# Patient Record
Sex: FEMALE | URBAN - METROPOLITAN AREA
[De-identification: names, ages, dates, MRNs, and addresses within clinical notes are randomized per-mention and may not be internally consistent; named-entity substitution may affect disease eponyms.]

---

## 2019-06-19 ENCOUNTER — IMPORTED ENCOUNTER (OUTPATIENT)
Dept: URBAN - METROPOLITAN AREA CLINIC 88 | Facility: CLINIC | Age: 84
End: 2019-06-19

## 2021-05-26 ENCOUNTER — IMPORTED ENCOUNTER (OUTPATIENT)
Dept: URBAN - METROPOLITAN AREA CLINIC 88 | Facility: CLINIC | Age: 86
End: 2021-05-26

## 2021-11-19 ENCOUNTER — IMPORTED ENCOUNTER (OUTPATIENT)
Dept: URBAN - METROPOLITAN AREA CLINIC 88 | Facility: CLINIC | Age: 86
End: 2021-11-19

## 2022-02-14 ENCOUNTER — INPATIENT (INPATIENT)
Facility: HOSPITAL | Age: 87
LOS: 3 days | Discharge: NURSING, REHAB OR LONG-TERM CARE | DRG: 884 | End: 2022-02-18
Attending: HOSPITALIST | Admitting: STUDENT IN AN ORGANIZED HEALTH CARE EDUCATION/TRAINING PROGRAM
Payer: MEDICARE

## 2022-02-14 VITALS
TEMPERATURE: 99 F | DIASTOLIC BLOOD PRESSURE: 84 MMHG | HEIGHT: 61 IN | HEART RATE: 82 BPM | OXYGEN SATURATION: 97 % | SYSTOLIC BLOOD PRESSURE: 129 MMHG | WEIGHT: 160.06 LBS | RESPIRATION RATE: 18 BRPM

## 2022-02-14 DIAGNOSIS — Z98.890 OTHER SPECIFIED POSTPROCEDURAL STATES: Chronic | ICD-10-CM

## 2022-02-14 DIAGNOSIS — Z95.5 PRESENCE OF CORONARY ANGIOPLASTY IMPLANT AND GRAFT: Chronic | ICD-10-CM

## 2022-02-14 LAB
ALBUMIN SERPL ELPH-MCNC: 3.6 G/DL — SIGNIFICANT CHANGE UP (ref 3.3–5)
ALP SERPL-CCNC: 115 U/L — SIGNIFICANT CHANGE UP (ref 40–120)
ALT FLD-CCNC: 17 U/L — SIGNIFICANT CHANGE UP (ref 10–45)
AMMONIA BLD-MCNC: <10 UMOL/L — LOW (ref 11–55)
ANION GAP SERPL CALC-SCNC: 14 MMOL/L — SIGNIFICANT CHANGE UP (ref 5–17)
APPEARANCE UR: CLEAR — SIGNIFICANT CHANGE UP
APTT BLD: 30.1 SEC — SIGNIFICANT CHANGE UP (ref 27.5–35.5)
AST SERPL-CCNC: 13 U/L — SIGNIFICANT CHANGE UP (ref 10–40)
BACTERIA # UR AUTO: NEGATIVE — SIGNIFICANT CHANGE UP
BASE EXCESS BLDV CALC-SCNC: 3.1 MMOL/L — HIGH (ref -2–2)
BASOPHILS # BLD AUTO: 0.05 K/UL — SIGNIFICANT CHANGE UP (ref 0–0.2)
BASOPHILS NFR BLD AUTO: 0.4 % — SIGNIFICANT CHANGE UP (ref 0–2)
BILIRUB SERPL-MCNC: 0.4 MG/DL — SIGNIFICANT CHANGE UP (ref 0.2–1.2)
BILIRUB UR-MCNC: NEGATIVE — SIGNIFICANT CHANGE UP
BUN SERPL-MCNC: 29 MG/DL — HIGH (ref 7–23)
CA-I SERPL-SCNC: 1.19 MMOL/L — SIGNIFICANT CHANGE UP (ref 1.15–1.33)
CALCIUM SERPL-MCNC: 9.5 MG/DL — SIGNIFICANT CHANGE UP (ref 8.4–10.5)
CHLORIDE BLDV-SCNC: 97 MMOL/L — SIGNIFICANT CHANGE UP (ref 96–108)
CHLORIDE SERPL-SCNC: 95 MMOL/L — LOW (ref 96–108)
CK SERPL-CCNC: 85 U/L — SIGNIFICANT CHANGE UP (ref 25–170)
CO2 BLDV-SCNC: 30 MMOL/L — HIGH (ref 22–26)
CO2 SERPL-SCNC: 24 MMOL/L — SIGNIFICANT CHANGE UP (ref 22–31)
COLOR SPEC: YELLOW — SIGNIFICANT CHANGE UP
CREAT SERPL-MCNC: 1.27 MG/DL — SIGNIFICANT CHANGE UP (ref 0.5–1.3)
DIFF PNL FLD: NEGATIVE — SIGNIFICANT CHANGE UP
EOSINOPHIL # BLD AUTO: 0.17 K/UL — SIGNIFICANT CHANGE UP (ref 0–0.5)
EOSINOPHIL NFR BLD AUTO: 1.3 % — SIGNIFICANT CHANGE UP (ref 0–6)
EPI CELLS # UR: 4 /HPF — SIGNIFICANT CHANGE UP
GAS PNL BLDV: 129 MMOL/L — LOW (ref 136–145)
GAS PNL BLDV: SIGNIFICANT CHANGE UP
GAS PNL BLDV: SIGNIFICANT CHANGE UP
GLUCOSE BLDV-MCNC: 113 MG/DL — HIGH (ref 70–99)
GLUCOSE SERPL-MCNC: 111 MG/DL — HIGH (ref 70–99)
GLUCOSE UR QL: NEGATIVE — SIGNIFICANT CHANGE UP
HCO3 BLDV-SCNC: 28 MMOL/L — SIGNIFICANT CHANGE UP (ref 22–29)
HCT VFR BLD CALC: 33.7 % — LOW (ref 34.5–45)
HCT VFR BLDA CALC: 34 % — LOW (ref 34.5–46.5)
HGB BLD CALC-MCNC: 11.2 G/DL — LOW (ref 11.7–16.1)
HGB BLD-MCNC: 10.8 G/DL — LOW (ref 11.5–15.5)
HYALINE CASTS # UR AUTO: 4 /LPF — HIGH (ref 0–2)
IMM GRANULOCYTES NFR BLD AUTO: 0.5 % — SIGNIFICANT CHANGE UP (ref 0–1.5)
INR BLD: 1.17 RATIO — HIGH (ref 0.88–1.16)
KETONES UR-MCNC: NEGATIVE — SIGNIFICANT CHANGE UP
LACTATE BLDV-MCNC: 1.5 MMOL/L — SIGNIFICANT CHANGE UP (ref 0.7–2)
LEUKOCYTE ESTERASE UR-ACNC: NEGATIVE — SIGNIFICANT CHANGE UP
LIDOCAIN IGE QN: 32 U/L — SIGNIFICANT CHANGE UP (ref 7–60)
LYMPHOCYTES # BLD AUTO: 1.31 K/UL — SIGNIFICANT CHANGE UP (ref 1–3.3)
LYMPHOCYTES # BLD AUTO: 10.1 % — LOW (ref 13–44)
MAGNESIUM SERPL-MCNC: 1.7 MG/DL — SIGNIFICANT CHANGE UP (ref 1.6–2.6)
MCHC RBC-ENTMCNC: 29.4 PG — SIGNIFICANT CHANGE UP (ref 27–34)
MCHC RBC-ENTMCNC: 32 GM/DL — SIGNIFICANT CHANGE UP (ref 32–36)
MCV RBC AUTO: 91.8 FL — SIGNIFICANT CHANGE UP (ref 80–100)
MONOCYTES # BLD AUTO: 1.17 K/UL — HIGH (ref 0–0.9)
MONOCYTES NFR BLD AUTO: 9 % — SIGNIFICANT CHANGE UP (ref 2–14)
NEUTROPHILS # BLD AUTO: 10.18 K/UL — HIGH (ref 1.8–7.4)
NEUTROPHILS NFR BLD AUTO: 78.7 % — HIGH (ref 43–77)
NITRITE UR-MCNC: NEGATIVE — SIGNIFICANT CHANGE UP
NRBC # BLD: 0 /100 WBCS — SIGNIFICANT CHANGE UP (ref 0–0)
NT-PROBNP SERPL-SCNC: 593 PG/ML — HIGH (ref 0–300)
PCO2 BLDV: 46 MMHG — HIGH (ref 39–42)
PH BLDV: 7.4 — SIGNIFICANT CHANGE UP (ref 7.32–7.43)
PH UR: 6 — SIGNIFICANT CHANGE UP (ref 5–8)
PHOSPHATE SERPL-MCNC: 3.5 MG/DL — SIGNIFICANT CHANGE UP (ref 2.5–4.5)
PLATELET # BLD AUTO: 313 K/UL — SIGNIFICANT CHANGE UP (ref 150–400)
PO2 BLDV: 39 MMHG — SIGNIFICANT CHANGE UP (ref 25–45)
POTASSIUM BLDV-SCNC: 3.4 MMOL/L — LOW (ref 3.5–5.1)
POTASSIUM SERPL-MCNC: 3.6 MMOL/L — SIGNIFICANT CHANGE UP (ref 3.5–5.3)
POTASSIUM SERPL-SCNC: 3.6 MMOL/L — SIGNIFICANT CHANGE UP (ref 3.5–5.3)
PROT SERPL-MCNC: 7.1 G/DL — SIGNIFICANT CHANGE UP (ref 6–8.3)
PROT UR-MCNC: ABNORMAL
PROTHROM AB SERPL-ACNC: 13.9 SEC — HIGH (ref 10.6–13.6)
RAPID RVP RESULT: SIGNIFICANT CHANGE UP
RBC # BLD: 3.67 M/UL — LOW (ref 3.8–5.2)
RBC # FLD: 15.9 % — HIGH (ref 10.3–14.5)
RBC CASTS # UR COMP ASSIST: 1 /HPF — SIGNIFICANT CHANGE UP (ref 0–4)
SAO2 % BLDV: 69 % — SIGNIFICANT CHANGE UP (ref 67–88)
SARS-COV-2 RNA SPEC QL NAA+PROBE: SIGNIFICANT CHANGE UP
SODIUM SERPL-SCNC: 133 MMOL/L — LOW (ref 135–145)
SP GR SPEC: 1.02 — SIGNIFICANT CHANGE UP (ref 1.01–1.02)
TROPONIN T, HIGH SENSITIVITY RESULT: 39 NG/L — SIGNIFICANT CHANGE UP (ref 0–51)
TROPONIN T, HIGH SENSITIVITY RESULT: 40 NG/L — SIGNIFICANT CHANGE UP (ref 0–51)
UROBILINOGEN FLD QL: NEGATIVE — SIGNIFICANT CHANGE UP
WBC # BLD: 12.94 K/UL — HIGH (ref 3.8–10.5)
WBC # FLD AUTO: 12.94 K/UL — HIGH (ref 3.8–10.5)
WBC UR QL: 4 /HPF — SIGNIFICANT CHANGE UP (ref 0–5)

## 2022-02-14 PROCEDURE — 93010 ELECTROCARDIOGRAM REPORT: CPT | Mod: GC

## 2022-02-14 PROCEDURE — 70450 CT HEAD/BRAIN W/O DYE: CPT | Mod: 26,QQ

## 2022-02-14 PROCEDURE — 99285 EMERGENCY DEPT VISIT HI MDM: CPT | Mod: GC

## 2022-02-14 PROCEDURE — 93970 EXTREMITY STUDY: CPT | Mod: 26

## 2022-02-14 PROCEDURE — 74177 CT ABD & PELVIS W/CONTRAST: CPT | Mod: 26,QQ

## 2022-02-14 PROCEDURE — 71045 X-RAY EXAM CHEST 1 VIEW: CPT | Mod: 26

## 2022-02-14 NOTE — ED ADULT NURSE NOTE - OBJECTIVE STATEMENT
86 year old female coming in for weakness. Brought in by EMS from the [facility]. As per EMS the patient has been working with physical therapy and since Friday they have noticed she has been more weak. 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 As per the daughter who is at bedside the patient is normally able to ambulate with the PT or with strong assist how ever since Friday she has not been able to. As per the daughter the patient is at her baseline orientation. Pt is aox3 forgetful and has word finding trouble. 86 year old female coming in for weakness. Brought in by EMS from the [facility]. As per EMS the patient has been working with physical therapy and since Friday they have noticed she has been more weak. 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 As per the daughter who is at bedside the patient is normally able to ambulate with the PT or with strong assist how ever since Friday she has not been able to. As per the daughter the patient is at her baseline orientation. Pt is aox3 forgetful and has word finding trouble. No shortness of breath or difficulty breathing. No chest pain, pressure or palpitations. No abdominal pain, nausea or vomiting. No fever, chills, or body aches. No urinary symptoms, no burning, no foul smelling. Skin on bottom is intact. No swelling noted. 86 year old female coming in for weakness. Brought in by EMS from the [facility]. As per EMS the patient has been working with physical therapy and since Friday they have noticed she has been more weak. 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 As per the daughter who is at bedside the patient is normally able to ambulate with the PT or with strong assist how ever since Friday she has not been able to. As per the daughter the patient is at her baseline orientation. Pt is aox2 forgetful and has word finding trouble. Patient does have full ROM in all extremities. No shortness of breath or difficulty breathing. No chest pain, pressure or palpitations. No abdominal pain, nausea or vomiting. No fever, chills, or body aches. No urinary symptoms, no burning, no foul smelling. Skin on bottom is intact. No swelling noted.

## 2022-02-14 NOTE — ED PROVIDER NOTE - NSICDXPASTMEDICALHX_GEN_ALL_CORE_FT
PAST MEDICAL HISTORY:  Cerebrovascular accident (CVA) 07/21    History of DVT of lower extremity LLE    Hypertension     Hypothyroidism     Urinary incontinence

## 2022-02-14 NOTE — ED PROVIDER NOTE - OBJECTIVE STATEMENT
Pt is an 86yF, poor historian, with pmhx of hypothyroidism, CVA in 7/2021 w/ residual LUE weakness, ACS s/p stent on plavix, hx of DVT (previously on Lovenox) presenting with new onset of weakness and instability for past 2 days. Pt is alert and oriented but confused about why she is here. Brought in by daughter who reports that patient has been requiring more assistance with walking since Saturday, baseline walks independently with walker but now requires two person transport and is showing right-sided preference for mobility. Daughter said her mother told her she was experiencing left leg pain today. Daughter also notes that pt has been more lethargic and confused than usual over the past few days. Pt denies recent fever, chills, chest pain, abdominal pain, urinary discomfort, diarrhea. She lives at [facility]. Daughter denies any falls or trauma since hip injury last year.

## 2022-02-14 NOTE — ED PROVIDER NOTE - CLINICAL SUMMARY MEDICAL DECISION MAKING FREE TEXT BOX
Pt is an 86yF, poor historian, with pmhx of hypothyroidism, CVA in 7/2021 w/ residual LUE weakness, ACS s/p stent on plavix, hx of DVT (previously on Lovenox) presenting with new onset of weakness and instability for past 2 days w/ associated lethargy and confusion and onset of left leg pain today. Pt denies recent fever, chills, chest pain, abdominal pain, urinary discomfort, diarrhea. Pt is afebrile but tender to palpation in suprapubic area, suggesting possible UTI. Ddx also includes electrolyte imbalance vs. hypothyroidism (unknown compliance) vs anemia. Will order CBC, BMP, urinalysis. Less likely CVA given timing and lack of new focal deficits but will order CT Head due to history.  Given hx of DVT and calf/leg pain, will order b/l duplex of the lower extremities. Pt is an 86yF, poor historian, with pmhx of hypothyroidism, CVA in 7/2021 w/ residual LUE weakness, ACS s/p stent on plavix, hx of DVT (previously on Lovenox) presenting with new onset of weakness and instability for past 2 days w/ associated lethargy and confusion and onset of left leg pain today. Pt denies recent fever, chills, chest pain, abdominal pain, urinary discomfort, diarrhea. Pt is afebrile but tender to palpation in suprapubic area, suggesting possible UTI. Ddx also includes electrolyte imbalance vs. hypothyroidism (unknown compliance) vs anemia. Will order CBC, BMP, urinalysis. Less likely CVA given timing and lack of new focal deficits but will order CT Head due to history. 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 Given hx of DVT and calf/leg pain, will order b/l duplex of the lower extremities.

## 2022-02-14 NOTE — ED PROVIDER NOTE - NS ED ROS FT
CONSTITUTIONAL: No fevers or chills. +weakness, instability when walking  EYES/ENT: No visual changes;  No vertigo or throat pain   NECK: No pain or stiffness  RESPIRATORY: No cough, wheezing, hemoptysis; No shortness of breath  CARDIOVASCULAR: No chest pain or palpitations  GASTROINTESTINAL: No abdominal or epigastric pain. No nausea, vomiting, or hematemesis; No diarrhea. No melena or hematochezia. +constipation   GENITOURINARY: No dysuria, frequency or hematuria  NEUROLOGICAL: baseline residual LUE weakness. No numbness.   SKIN: No itching, burning, rashes, or lesions   All other review of systems is negative unless indicated above.

## 2022-02-14 NOTE — ED ADULT NURSE NOTE - BEFAST SPEECH PHRASE
REVIEW OF SYSTEMS:  GEN: no fever,    no chills  RESP: no SOB,   no cough  CVS: no chest pain,   no palpitations  GI: no abdominal pain,   no nausea,   no vomiting,    constipation,   no diarrhea  : no dysuria,   no frequency  NEURO: no headache,   no dizziness  PSYCH: no depression,   not anxious  Derm : no rash Yes

## 2022-02-14 NOTE — ED PROVIDER NOTE - ATTENDING CONTRIBUTION TO CARE
Patient is an 87 yo F with history of HTN, hypothyroidism, CVA in 2021 with residual LUE weakness, hx of DVT on lovenox here for evaluation of weakness and difficulty with walking x 2 days. Patient lives in the [facility], assisted living. Patient's daughter is at bedside providing additional history. Accordingly, patient walks with walker but has been unsteady, leaning to one side. She feels her mother has been more confused. No chest pain, shortness of breath, nausea, vomiting. No urinary symptoms, fevers, chills, headache. Patient also complaining of abdominal pain and leg pain.    VS noted  Gen. no acute distress, Non toxic   HEENT: EOMI, mmm  Lungs: CTAB/L no C/ W /R   CVS: RRR   Abd; Soft, mild ttp to lower abdomen, no rebound or guarding  Ext: b/l LE pitting edema  Skin: no rash  Neuro AAOx3, face symmetrical, strength RUE 5/5, LUE 4/5, non focal clear speech  a/p: generalized weakness, has hx of CVA with left sided weakness. Will check labs, CT imaging to evaluate for new stroke. Patient also complaining of lower abdominal pain. Will check CT A/P. Will check u/a to evaluate for UTI. If no etiology, plan for neurology consult.   - Lizeth BARRAZA

## 2022-02-14 NOTE — ED PROVIDER NOTE - PHYSICAL EXAMINATION
GENERAL: NAD, lying in bed comfortably  HEAD:  Atraumatic, Normocephalic  EYES: EOMI, PERRL, conjunctiva and sclera clear  ENT: Moist mucous membranes  NECK: Supple, No JVD  CHEST/LUNG: Clear to auscultation bilaterally; No rales, rhonchi, wheezing, or rubs. Unlabored respirations  HEART: Regular rate and rhythm; No murmurs, rubs, or gallops  ABDOMEN: BSx4; tender to palpation in LLQ and suprapubic area   EXTREMITIES:  2+ Peripheral Pulses, brisk capillary refill. No clubbing, cyanosis. +b/l LE pitting edema, pain upon palpation of calves b/l.   NERVOUS SYSTEM:  A&Ox3, cranial nerves grossly intact. RUE 5/5 strength. LUE 4/5 with lifting against gravity (baseline).  strength 5/5 b/l. Cannot lift lower extremities against gravity.  LE Dorsiflexion/Extension 5/5 b/l.   SKIN: No rashes or lesions

## 2022-02-14 NOTE — ED ADULT NURSE NOTE - NSIMPLEMENTINTERV_GEN_ALL_ED
Implemented All Fall with Harm Risk Interventions:  [place] to call system. Call bell, personal items and telephone within reach. Instruct patient to call for assistance. Room bathroom lighting operational. Non-slip footwear when patient is off stretcher. Physically safe environment: no spills, clutter or unnecessary equipment. Stretcher in lowest position, wheels locked, appropriate side rails in place. Provide visual cue, wrist band, yellow gown, etc. Monitor gait and stability. Monitor for mental status changes and reorient to person, place, and time. Review medications for side effects contributing to fall risk. Reinforce activity limits and safety measures with patient and family. Provide visual clues: red socks.

## 2022-02-15 DIAGNOSIS — D72.829 ELEVATED WHITE BLOOD CELL COUNT, UNSPECIFIED: ICD-10-CM

## 2022-02-15 DIAGNOSIS — G93.89 OTHER SPECIFIED DISORDERS OF BRAIN: ICD-10-CM

## 2022-02-15 DIAGNOSIS — R53.1 WEAKNESS: ICD-10-CM

## 2022-02-15 DIAGNOSIS — I25.10 ATHEROSCLEROTIC HEART DISEASE OF NATIVE CORONARY ARTERY WITHOUT ANGINA PECTORIS: ICD-10-CM

## 2022-02-15 DIAGNOSIS — I63.9 CEREBRAL INFARCTION, UNSPECIFIED: ICD-10-CM

## 2022-02-15 DIAGNOSIS — Z29.9 ENCOUNTER FOR PROPHYLACTIC MEASURES, UNSPECIFIED: ICD-10-CM

## 2022-02-15 DIAGNOSIS — S32.000A WEDGE COMPRESSION FRACTURE OF UNSPECIFIED LUMBAR VERTEBRA, INITIAL ENCOUNTER FOR CLOSED FRACTURE: ICD-10-CM

## 2022-02-15 DIAGNOSIS — I10 ESSENTIAL (PRIMARY) HYPERTENSION: ICD-10-CM

## 2022-02-15 DIAGNOSIS — R26.2 DIFFICULTY IN WALKING, NOT ELSEWHERE CLASSIFIED: ICD-10-CM

## 2022-02-15 DIAGNOSIS — E03.9 HYPOTHYROIDISM, UNSPECIFIED: ICD-10-CM

## 2022-02-15 LAB
ANION GAP SERPL CALC-SCNC: 14 MMOL/L — SIGNIFICANT CHANGE UP (ref 5–17)
BASOPHILS # BLD AUTO: 0.06 K/UL — SIGNIFICANT CHANGE UP (ref 0–0.2)
BASOPHILS NFR BLD AUTO: 0.6 % — SIGNIFICANT CHANGE UP (ref 0–2)
BUN SERPL-MCNC: 25 MG/DL — HIGH (ref 7–23)
CALCIUM SERPL-MCNC: 9.7 MG/DL — SIGNIFICANT CHANGE UP (ref 8.4–10.5)
CHLORIDE SERPL-SCNC: 97 MMOL/L — SIGNIFICANT CHANGE UP (ref 96–108)
CO2 SERPL-SCNC: 25 MMOL/L — SIGNIFICANT CHANGE UP (ref 22–31)
CREAT SERPL-MCNC: 1.2 MG/DL — SIGNIFICANT CHANGE UP (ref 0.5–1.3)
CULTURE RESULTS: NO GROWTH — SIGNIFICANT CHANGE UP
EOSINOPHIL # BLD AUTO: 0.29 K/UL — SIGNIFICANT CHANGE UP (ref 0–0.5)
EOSINOPHIL NFR BLD AUTO: 3 % — SIGNIFICANT CHANGE UP (ref 0–6)
GLUCOSE SERPL-MCNC: 104 MG/DL — HIGH (ref 70–99)
HCT VFR BLD CALC: 33.5 % — LOW (ref 34.5–45)
HGB BLD-MCNC: 10.9 G/DL — LOW (ref 11.5–15.5)
IMM GRANULOCYTES NFR BLD AUTO: 0.8 % — SIGNIFICANT CHANGE UP (ref 0–1.5)
LYMPHOCYTES # BLD AUTO: 1.14 K/UL — SIGNIFICANT CHANGE UP (ref 1–3.3)
LYMPHOCYTES # BLD AUTO: 11.7 % — LOW (ref 13–44)
MCHC RBC-ENTMCNC: 29.6 PG — SIGNIFICANT CHANGE UP (ref 27–34)
MCHC RBC-ENTMCNC: 32.5 GM/DL — SIGNIFICANT CHANGE UP (ref 32–36)
MCV RBC AUTO: 91 FL — SIGNIFICANT CHANGE UP (ref 80–100)
MONOCYTES # BLD AUTO: 0.86 K/UL — SIGNIFICANT CHANGE UP (ref 0–0.9)
MONOCYTES NFR BLD AUTO: 8.8 % — SIGNIFICANT CHANGE UP (ref 2–14)
NEUTROPHILS # BLD AUTO: 7.35 K/UL — SIGNIFICANT CHANGE UP (ref 1.8–7.4)
NEUTROPHILS NFR BLD AUTO: 75.1 % — SIGNIFICANT CHANGE UP (ref 43–77)
NRBC # BLD: 0 /100 WBCS — SIGNIFICANT CHANGE UP (ref 0–0)
PLATELET # BLD AUTO: 270 K/UL — SIGNIFICANT CHANGE UP (ref 150–400)
POTASSIUM SERPL-MCNC: 3.5 MMOL/L — SIGNIFICANT CHANGE UP (ref 3.5–5.3)
POTASSIUM SERPL-SCNC: 3.5 MMOL/L — SIGNIFICANT CHANGE UP (ref 3.5–5.3)
RBC # BLD: 3.68 M/UL — LOW (ref 3.8–5.2)
RBC # FLD: 15.9 % — HIGH (ref 10.3–14.5)
SODIUM SERPL-SCNC: 136 MMOL/L — SIGNIFICANT CHANGE UP (ref 135–145)
SPECIMEN SOURCE: SIGNIFICANT CHANGE UP
TSH SERPL-MCNC: 4.1 UIU/ML — SIGNIFICANT CHANGE UP (ref 0.27–4.2)
WBC # BLD: 9.78 K/UL — SIGNIFICANT CHANGE UP (ref 3.8–10.5)
WBC # FLD AUTO: 9.78 K/UL — SIGNIFICANT CHANGE UP (ref 3.8–10.5)

## 2022-02-15 PROCEDURE — 99223 1ST HOSP IP/OBS HIGH 75: CPT

## 2022-02-15 PROCEDURE — 12345: CPT | Mod: NC

## 2022-02-15 PROCEDURE — 99223 1ST HOSP IP/OBS HIGH 75: CPT | Mod: GC

## 2022-02-15 PROCEDURE — 70551 MRI BRAIN STEM W/O DYE: CPT | Mod: 26

## 2022-02-15 RX ORDER — DULOXETINE HYDROCHLORIDE 30 MG/1
60 CAPSULE, DELAYED RELEASE ORAL DAILY
Refills: 0 | Status: DISCONTINUED | OUTPATIENT
Start: 2022-02-15 | End: 2022-02-18

## 2022-02-15 RX ORDER — METHENAMINE MANDELATE 1 G
1 TABLET ORAL
Refills: 0 | Status: DISCONTINUED | OUTPATIENT
Start: 2022-02-15 | End: 2022-02-18

## 2022-02-15 RX ORDER — DULOXETINE HYDROCHLORIDE 30 MG/1
1 CAPSULE, DELAYED RELEASE ORAL
Qty: 0 | Refills: 0 | DISCHARGE

## 2022-02-15 RX ORDER — LOSARTAN POTASSIUM 100 MG/1
100 TABLET, FILM COATED ORAL DAILY
Refills: 0 | Status: DISCONTINUED | OUTPATIENT
Start: 2022-02-15 | End: 2022-02-18

## 2022-02-15 RX ORDER — HYDROCHLOROTHIAZIDE 25 MG
12.5 TABLET ORAL DAILY
Refills: 0 | Status: DISCONTINUED | OUTPATIENT
Start: 2022-02-15 | End: 2022-02-18

## 2022-02-15 RX ORDER — ACETAMINOPHEN 500 MG
2 TABLET ORAL
Qty: 0 | Refills: 0 | DISCHARGE

## 2022-02-15 RX ORDER — LACTOBACILLUS ACIDOPHILUS 100MM CELL
1 CAPSULE ORAL
Qty: 0 | Refills: 0 | DISCHARGE

## 2022-02-15 RX ORDER — LEVOTHYROXINE SODIUM 125 MCG
112 TABLET ORAL DAILY
Refills: 0 | Status: DISCONTINUED | OUTPATIENT
Start: 2022-02-15 | End: 2022-02-18

## 2022-02-15 RX ORDER — ACETAMINOPHEN 500 MG
650 TABLET ORAL EVERY 6 HOURS
Refills: 0 | Status: DISCONTINUED | OUTPATIENT
Start: 2022-02-15 | End: 2022-02-18

## 2022-02-15 RX ORDER — CHOLECALCIFEROL (VITAMIN D3) 125 MCG
1 CAPSULE ORAL
Qty: 0 | Refills: 0 | DISCHARGE

## 2022-02-15 RX ORDER — CLOPIDOGREL BISULFATE 75 MG/1
75 TABLET, FILM COATED ORAL DAILY
Refills: 0 | Status: DISCONTINUED | OUTPATIENT
Start: 2022-02-15 | End: 2022-02-18

## 2022-02-15 RX ORDER — ASCORBIC ACID 60 MG
500 TABLET,CHEWABLE ORAL DAILY
Refills: 0 | Status: DISCONTINUED | OUTPATIENT
Start: 2022-02-15 | End: 2022-02-18

## 2022-02-15 RX ORDER — CHOLECALCIFEROL (VITAMIN D3) 125 MCG
1000 CAPSULE ORAL DAILY
Refills: 0 | Status: DISCONTINUED | OUTPATIENT
Start: 2022-02-15 | End: 2022-02-18

## 2022-02-15 RX ORDER — HEPARIN SODIUM 5000 [USP'U]/ML
5000 INJECTION INTRAVENOUS; SUBCUTANEOUS EVERY 12 HOURS
Refills: 0 | Status: DISCONTINUED | OUTPATIENT
Start: 2022-02-15 | End: 2022-02-18

## 2022-02-15 RX ORDER — PANTOPRAZOLE SODIUM 20 MG/1
40 TABLET, DELAYED RELEASE ORAL
Refills: 0 | Status: DISCONTINUED | OUTPATIENT
Start: 2022-02-15 | End: 2022-02-18

## 2022-02-15 RX ORDER — SENNA PLUS 8.6 MG/1
2 TABLET ORAL AT BEDTIME
Refills: 0 | Status: DISCONTINUED | OUTPATIENT
Start: 2022-02-15 | End: 2022-02-18

## 2022-02-15 RX ORDER — ATORVASTATIN CALCIUM 80 MG/1
1 TABLET, FILM COATED ORAL
Qty: 0 | Refills: 0 | DISCHARGE

## 2022-02-15 RX ORDER — LACTOBACILLUS ACIDOPHILUS 100MM CELL
1 CAPSULE ORAL DAILY
Refills: 0 | Status: DISCONTINUED | OUTPATIENT
Start: 2022-02-15 | End: 2022-02-18

## 2022-02-15 RX ORDER — LEVOTHYROXINE SODIUM 125 MCG
1 TABLET ORAL
Qty: 0 | Refills: 0 | DISCHARGE

## 2022-02-15 RX ORDER — METHENAMINE MANDELATE 1 G
1 TABLET ORAL
Qty: 0 | Refills: 0 | DISCHARGE

## 2022-02-15 RX ORDER — CLOPIDOGREL BISULFATE 75 MG/1
1 TABLET, FILM COATED ORAL
Qty: 0 | Refills: 0 | DISCHARGE

## 2022-02-15 RX ORDER — ATORVASTATIN CALCIUM 80 MG/1
80 TABLET, FILM COATED ORAL AT BEDTIME
Refills: 0 | Status: DISCONTINUED | OUTPATIENT
Start: 2022-02-15 | End: 2022-02-18

## 2022-02-15 RX ADMIN — DULOXETINE HYDROCHLORIDE 60 MILLIGRAM(S): 30 CAPSULE, DELAYED RELEASE ORAL at 13:49

## 2022-02-15 RX ADMIN — Medication 12.5 MILLIGRAM(S): at 13:50

## 2022-02-15 RX ADMIN — Medication 500 MILLIGRAM(S): at 13:49

## 2022-02-15 RX ADMIN — Medication 1000 UNIT(S): at 13:50

## 2022-02-15 RX ADMIN — LOSARTAN POTASSIUM 100 MILLIGRAM(S): 100 TABLET, FILM COATED ORAL at 13:49

## 2022-02-15 RX ADMIN — Medication 1 GRAM(S): at 18:30

## 2022-02-15 RX ADMIN — Medication 112 MICROGRAM(S): at 13:50

## 2022-02-15 RX ADMIN — HEPARIN SODIUM 5000 UNIT(S): 5000 INJECTION INTRAVENOUS; SUBCUTANEOUS at 18:30

## 2022-02-15 RX ADMIN — ATORVASTATIN CALCIUM 80 MILLIGRAM(S): 80 TABLET, FILM COATED ORAL at 23:18

## 2022-02-15 RX ADMIN — CLOPIDOGREL BISULFATE 75 MILLIGRAM(S): 75 TABLET, FILM COATED ORAL at 13:50

## 2022-02-15 RX ADMIN — Medication 1 TABLET(S): at 13:50

## 2022-02-15 RX ADMIN — PANTOPRAZOLE SODIUM 40 MILLIGRAM(S): 20 TABLET, DELAYED RELEASE ORAL at 08:10

## 2022-02-15 RX ADMIN — SENNA PLUS 2 TABLET(S): 8.6 TABLET ORAL at 23:18

## 2022-02-15 RX ADMIN — Medication 1 TABLET(S): at 14:22

## 2022-02-15 RX ADMIN — Medication 1 TABLET(S): at 13:49

## 2022-02-15 NOTE — H&P ADULT - ASSESSMENT
86F w hx of CVA c/b LUE weakness, dementia?(charted A&OX2 baseline), CAD s/p stent, HTN, hx of DVT, hypothyroid p/w weakness and confusion from baseline admit to medicine for further evaluation.  - Neurology and NeuroSx consulted  - chronic appearing L3-L5 compression fracture and stenosis per NeuroSx does not require acute surgical intervention  - Per Neuro- MR brain      FULL H&P to follow 86F w hx of CVA c/b LUE weakness, dementia?(charted A&OX2 baseline), CAD s/p stent, HTN, hx of DVT, hypothyroid p/w weakness and confusion from baseline admit to medicine for further evaluation.

## 2022-02-15 NOTE — CONSULT NOTE ADULT - ASSESSMENT
INCOMPLETE  Assessment:  86y L-HF w h/o hypothyroidism, HTN, stroke w/ residual L. hemiparesis, urinary incontinence, LE DVT and dementia who p/w increased weakness.    On exam, she has a decreased level of attention, dysconjugate gaze, spastic L. hemiparesis and hyperreflexia.  No clear new localizing signs/symptoms.       IMPRESSION: Unclear subacute exacerbation of weakness.     Plan  [] MRI brain w/o contrast: Although unclear new lateralizing signs symptoms, given poor historian and h/o stroke would    []  []    Nando Braden, DO  PGY-4 Chief Neurology Resident  86y L-HF w h/o hypothyroidism, HTN, stroke (07/2021) w/ residual L. hemiparesis, urinary incontinence, LE DVT and unspecified dementia who p/w increased weakness x several days for whom neurology was consulted for radiographic finding suggestive of NPH.  On exam, she has a decreased level of attention, poor insight and memory, slightly reduced verbal output, dysconjugate at gaze at rest, spastic L. hemiparesis a/w hyperreflexia.  No clear new localizing signs/symptoms.    IMPRESSION: Acute onset significant generalized weakness LE>UE superimposed on chronic L. hemiparesis post stroke.  Etiology of acute worsening unclear, but given h/o stroke in the past and acute change should r/o acute ischemic infarct and also possible influence of age-indeterminate chronic-appearing L3-L5 compression fx.     Plan  [] MRI brain w/o contrast: Although unclear new lateralizing signs symptoms, given poor historian and h/o stroke would rule out new stroke.   [] For now, continue prior primary stroke prevention regiment.   [] Normotension given LKW at least >48hrs out  [] Continue metabolic/infectious work up to look for acute exacerbating factor.   [] Please call Dr. Grayson's group to discuss if either he or someone from the group will be seeing the PT rather than house staff resident neurology consult team.      Nando Braden, DO  PGY-4 Chief Neurology Resident

## 2022-02-15 NOTE — H&P ADULT - PROBLEM SELECTOR PLAN 8
family reports chronic leukocytosis  on hiprex outpatient  urine culture collected  no fever at time of medicine admit and is nontoxic appearing

## 2022-02-15 NOTE — H&P ADULT - NSHPLABSRESULTS_GEN_ALL_CORE
Personally reviewed available labs, imaging and ekg  [1]  CBC Full  -  ( 14 Feb 2022 20:24 )  WBC Count : 12.94 K/uL  RBC Count : 3.67 M/uL  Hemoglobin : 10.8 g/dL  Hematocrit : 33.7 %  Platelet Count - Automated : 313 K/uL  Mean Cell Volume : 91.8 fl  Mean Cell Hemoglobin : 29.4 pg  Mean Cell Hemoglobin Concentration : 32.0 gm/dL  Auto Neutrophil # : 10.18 K/uL  Auto Lymphocyte # : 1.31 K/uL  Auto Monocyte # : 1.17 K/uL  Auto Eosinophil # : 0.17 K/uL  Auto Basophil # : 0.05 K/uL  Auto Neutrophil % : 78.7 %  Auto Lymphocyte % : 10.1 %  Auto Monocyte % : 9.0 %  Auto Eosinophil % : 1.3 %  Auto Basophil % : 0.4 %    02-14    133<L>  |  95<L>  |  29<H>  ----------------------------<  111<H>  3.6   |  24  |  1.27    Ca    9.5      14 Feb 2022 20:24  Phos  3.5     02-14  Mg     1.7     02-14    TPro  7.1  /  Alb  3.6  /  TBili  0.4  /  DBili  x   /  AST  13  /  ALT  17  /  AlkPhos  115  02-14    PT/INR - ( 14 Feb 2022 20:24 )   PT: 13.9 sec;   INR: 1.17 ratio         PTT - ( 14 Feb 2022 20:24 )  PTT:30.1 sec  Imaging:  [ 2] I independently reviewed CT head and no acute hemorrhage is appreciated  EKG:  [2] I independently reviewed EKG/cardiac tracing and NSR appreciated

## 2022-02-15 NOTE — H&P ADULT - PROBLEM SELECTOR PLAN 3
hx of cva  c/w clopidogrel  has LUE weakness that is associated w/ previous CVA as well vascular dementia  - follows with neurologist Dr. Grayson of [facility] [phone]

## 2022-02-15 NOTE — H&P ADULT - NSICDXPASTMEDICALHX_GEN_ALL_CORE_FT
PAST MEDICAL HISTORY:  Cerebrovascular accident (CVA) 07/21    Dementia     History of DVT of lower extremity LLE    Hypertension     Hypothyroidism     Urinary incontinence

## 2022-02-15 NOTE — CONSULT NOTE ADULT - ASSESSMENT
MEENU, NACHAMA  86F hx prior cerebellar strokes 2021 w/ residual Lt-sided weakness, also CAD s/p stent on plavix, hx DVT, pw increased generalized weakness/instability x2d brought by daughter from assisted living, no trauma. CTH w/ likely ex vacuo hydro 2/2 cerebellar infarcts/microvasc changes/atrophy, chronic L3-5 comp fx w/ moderate spinal stenosis rajni at L4-5, no back pain or radic. Exam: EOV, Ox1-2 w/ choices, PERRL, +L beat nystagmus, no facial, BUE ag strong R>L, LLE HF 2/5, KF 3/5, DF/PF 4+/5, RLE 4+/5 throughout,+trace clonus RLE, no hyperreflexia.   - no acute nsgy intervention  - consider neurology fu for eval NPH vs vascular dementia   - can fu outpt w/ Dr. Norris prn if back pain or radicular sxs arise or if positive neurology eval for NPH  - PT, q4h neuro checks

## 2022-02-15 NOTE — CONSULT NOTE ADULT - SUBJECTIVE AND OBJECTIVE BOX
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 CTH w/ likely ex vacuo hydro 2/2 cerebellar infarcts/microvasc changes/atrophy, chronic L3-5 comp fx w/ moderate spinal stenosis rajni at L4-5, no back pain or radic. Exam: EOV, Ox1-2 w/ choices, PERRL, +L beat nystagmus, no facial, BUE ag strong R>L, LLE HF 2/5, KF 3/5, DF/PF 4+/5, RLE 4+/5 throughout,+trace clonus RLE, no hyperreflexia.     --Anticoagulation:    =====================  PAST MEDICAL HISTORY   Hypothyroidism    Hypertension    Cerebrovascular accident (CVA)    Urinary incontinence    History of DVT of lower extremity    Dementia      PAST SURGICAL HISTORY   Cerebrovascular accident (CVA)    History of hip surgery    Stented coronary artery          MEDICATIONS:  Antibiotics:    Neuro:    Other:      SOCIAL HISTORY:   Occupation:   Marital Status:     FAMILY HISTORY:      ROS: Negative except per HPI    LABS:  PT/INR - ( 14 Feb 2022 20:24 )   PT: 13.9 sec;   INR: 1.17 ratio         PTT - ( 14 Feb 2022 20:24 )  PTT:30.1 sec                        10.8   12.94 )-----------( 313      ( 14 Feb 2022 20:24 )             33.7     02-14    133<L>  |  95<L>  |  29<H>  ----------------------------<  111<H>  3.6   |  24  |  1.27    Ca    9.5      14 Feb 2022 20:24  Phos  3.5     02-14  Mg     1.7     02-14    TPro  7.1  /  Alb  3.6  /  TBili  0.4  /  DBili  x   /  AST  13  /  ALT  17  /  AlkPhos  115  02-14      
NEUROLOGY CONSULT  HPI: 86y L-handed woman w/ h/o dementia w/ visual hallcuinations/behavioral issues and prior stroke (07/2021) w/ residual L. hemiparesis, CAD s/p stent, HTN, DVT and hypothyroidism brought in from home for several days of worsening weakness and confusion.  Called daughter, Mrs. Zhanna Covington [phone], for collateral.     PT has had long-standing dementia and at baseline is able to have simple conversations w/ interaction of staff at [facility]/family, speak in short full sentences, is normally oriented to self and situation and ambulates w/ walker w/ L. hemiparesis.  LKW sometime around 02/10/22.  Daughter went to visit PT and she noted she was much more lethargic over the weekend  "staring off into space/not acting like herself" and was significantly weaker than normal.  Weakness was generalized in nature superimposed on her prior residual deficits w/ no reported new focal deficits.  Patient had bene working with physical therapy who also noted significant decline in mobility, now requiring one-two person assist to just stand up.  No clear fever, change in meds, infection or other trigger that daughter can think of.  PT had recently seen Dr. Grayson and was suggested to be started on medication for dementia (daughter unsure, possibly Aricept but family decided to hold off as PT had already had many medications.  Ventriculomegaly was discussed at that time, and per daughter, Dr. Grayson, explained that this was a part of her overall aging and atrophy rather than specifically attributable reason for behavior/gait issuses.  PT has also been incontinent for many years, independent of current presentation.  It is unclear if PT had actually any L. leg pain as some reports report yes and daughter unsure.  PT endorses pain overall but doesn't endorse/refute L. leg pain.     Of note, no reported back pain or falls from PT, daughter or home.  When discussing CT abdomen findings w/ daughter w/ reported findings of L3-L5 fractures, daughter denies being aware of any spinal fractures in past.      PMH  Hypothyroidism  Hypertension  07/2021 Ischemic stroke w/ residual L. hemiparesis   Urinary incontinence (several years)  History of DVT of lower extremity LLE  Dementia (W. hallucinations/behavioral component? Unspecified type)    Outpatient neurologist: Dr. Grayson    [facility]  History of hip surgery 7/2021  Stented coronary artery    Home Medications:  acetaminophen 325 mg oral tablet: 2 tab(s) orally every 6 hours, As Needed (15 Feb 2022 07:06)  Acidophilus oral capsule: 1 cap(s) orally once a day (15 Feb 2022 07:06)  atorvastatin 80 mg oral tablet: 1 tab(s) orally once a day (15 Feb 2022 07:06)  Citracal 250 mg + D 200 intl units oral tablet: 1 tab(s) orally once a day (15 Feb 2022 07:06)  clopidogrel 75 mg oral tablet: 1 tab(s) orally once a day (15 Feb 2022 07:06)  DULoxetine 60 mg oral delayed release capsule: 1 cap(s) orally once a day (15 Feb 2022 07:06)  levothyroxine 112 mcg (0.112 mg) oral tablet: 1 tab(s) orally once a day (15 Feb 2022 07:06)  methenamine hippurate 1 g oral tablet: 1 tab(s) orally 2 times a day (15 Feb 2022 07:06)  Multiple Vitamins oral tablet: 1 tab(s) orally once a day (15 Feb 2022 07:06)  olmesartan-hydrochlorothiazide 40 mg-12.5 mg oral tablet: 1 tab(s) orally once a day (15 Feb 2022 07:06)  omeprazole 20 mg oral delayed release tablet: 1 tab(s) orally once a day (15 Feb 2022 07:06)  Senexon-S 50 mg-8.6 mg oral tablet: 2 tab(s) orally once a day (at bedtime) (15 Feb 2022 07:06)  Vitamin C 500 mg oral tablet: 1 tab(s) orally once a day (15 Feb 2022 07:06)  Vitamin D3 25 mcg (1000 intl units) oral tablet: 1 tab(s) orally once a day (15 Feb 2022 07:06)  Vitron-C 125 mg-65 mg oral tablet: 1 tab(s) orally once a day (15 Feb 2022 07:06)    MEDICATIONS  (STANDING):  ascorbic acid 500 milliGRAM(s) Oral daily  atorvastatin 80 milliGRAM(s) Oral at bedtime  calcium carbonate 1250 mG  + Vitamin D (OsCal 500 + D) 1 Tablet(s) Oral daily  cholecalciferol 1000 Unit(s) Oral daily  clopidogrel Tablet 75 milliGRAM(s) Oral daily  DULoxetine 60 milliGRAM(s) Oral daily  heparin   Injectable 5000 Unit(s) SubCutaneous every 12 hours  hydrochlorothiazide 12.5 milliGRAM(s) Oral daily  lactobacillus acidophilus 1 Tablet(s) Oral daily  levothyroxine 112 MICROGram(s) Oral daily  losartan 100 milliGRAM(s) Oral daily  methenamine hippurate 1 Gram(s) Oral two times a day  multivitamin 1 Tablet(s) Oral daily  pantoprazole    Tablet 40 milliGRAM(s) Oral before breakfast  senna 2 Tablet(s) Oral at bedtime    MEDICATIONS  (PRN):  acetaminophen     Tablet .. 650 milliGRAM(s) Oral every 6 hours PRN Mild Pain (1 - 3), Moderate Pain (4 - 6)    Allergies  sulfa drugs (Unknown)    Social History:  Lives in [facility], no tobacco EtOH or drugs.     FAMILY HISTORY:  No pertinent family history in first degree relatives      OBJECTIVE  Vital Signs Last 24 Hrs  T(C): 36.4 (15 Feb 2022 06:35), Max: 37.6 (14 Feb 2022 19:15)  T(F): 97.6 (15 Feb 2022 06:35), Max: 99.7 (14 Feb 2022 19:15)  HR: 72 (15 Feb 2022 06:35) (60 - 82)  BP: 153/79 (15 Feb 2022 06:35) (129/84 - 172/89)  BP(mean): --  RR: 17 (15 Feb 2022 06:35) (17 - 20)  SpO2: 97% (15 Feb 2022 06:35) (95% - 98%)  Orthostatic VS    Height (cm): 154.9 (02-14)  Weight (kg): 72.6 (02-14)  BMI (kg/m2): 30.3 (02-14)    PHYSICAL EXAM:  Mental Status: Awake, oriented to self, situation and year but not location.  Moderate inattention  Short and Long term memory impaired.  Unable to explain symptoms and/or reason why she is here.  Psychomotor slowing.   Language: Reduced overall verbal output, able to name 4 objects, able to repeat, speaks in short 3-5 word sentences, follows simple one step commands.   CNs: PERRL (R = 3mm, L = 3mm).  EOMI, dysconjugate gaze at rest w/ OS esophoria.  No nystagmus on EOM testing. B/L V1-3 intact to light touch.  Mild L. facial palsy. Hearing grossly normal to conversation.  No dysarthria.  Palate midline & symmetric elevation.  Tongue midline, normal movements.  Cortical: Visual fields reduced to blinks to threat B/L.    Motor: Normal muscle bulk, L. sided significant increased tone arm and leg.  L. arm flexed at rest, L. leg externally rotated at rest.  4-/5 throughout RUE testing, at least anti-gravity RLE.  LUE and LLE w/ some effort against gravity.   Sensation: Decreased sensation to light touch and temperature throughout LUE and LLE.  Preserved throughout R. but w/ limited testing 2* inattention.     Reflexes: 1+ throughout RUE and RLE, 3+ throughout LUE and LLE w/ upgoing toe L.   Coordination: Normal R. sided FTN, L. side w/ some minimal ataxia.  PT did not want to try LE.    Gait: Requested to stand up w/ PT but she was significantly anxious about doing so for fear of falling.      Labs:                        10.9   9.78  )-----------( 270      ( 15 Feb 2022 08:23 )             33.5     02-15    136  |  97  |  25<H>  ----------------------------<  104<H>  3.5   |  25  |  1.20    Ca    9.7      15 Feb 2022 08:23  Phos  3.5     02-14  Mg     1.7     02-14    TPro  7.1  /  Alb  3.6  /  TBili  0.4  /  DBili  x   /  AST  13  /  ALT  17  /  AlkPhos  115  02-14    PT/INR - ( 14 Feb 2022 20:24 )   PT: 13.9 sec;   INR: 1.17 ratio      PTT - ( 14 Feb 2022 20:24 )  PTT:30.1 sec SARS-CoV-2: NotDetec (14 Feb 2022 20:32)    CT Head No Cont (02.14.22 @ 20:56)   The ventricles are dilated out of proportion to the sulcal prominence which could be due to central atrophy versus normal pressure  hydrocephalus.  Old lacunar infarcts are seen in the left subinsular region and sanjeev. No acute territorial infarct is demonstrated.  No acute intracranial hemorrhage, mass effect, or midline shift. A posterior fossa arachnoid cyst versus viki cisterna magna is noted.    CT Abdomen and Pelvis w/ IV Cont (02.14.22 @ 22:10)     KIDNEYS/URETERS: Right renal subcentimeter hypodense lesion which is too small for accurate characterization. Left kidney within normal limits.  BONES: Degenerative changes of the spine and right hip.   Age-indeterminate, but likely chronic, inferior endplate L3 and L4 compression fracture deformities.   Age-indeterminate L5 vertebral body compression fracture deformity resulting in less than 50% loss of vertebral body height. Partially visualized total left hip arthroplasty.    IMPRESSION:  1. No bowel obstruction or inflammation.  2. Age-indeterminate L3-L5 compression fracture deformities.

## 2022-02-15 NOTE — CONSULT NOTE ADULT - ATTENDING COMMENTS
HPI as per resident note, personally verified by me. Patient denies any new weakness, feels she is at her baseline.    Gen - NAD  CV - palpable peripheral pulses, no edema    Mental Status: AAO x2 (not time), Moderate inattention  Short and Long term memory impaired.  Unable to explain symptoms and/or reason why she is here.  Psychomotor slowing. Recent and remote memory 0/3 (0/3 with cue), fund of knowledge poor  Language: Reduced overall verbal output, able to name 4 objects, able to repeat, tangential speech, follows simple one step commands  CNs: PERRL (R = 3mm, L = 3mm).  EOMI, dysconjugate gaze at rest w/ OS esophoria.  No nystagmus on EOM testing. B/L V1-3 intact to light touch.  Mild L. facial palsy. Hearing grossly normal to conversation.  No dysarthria.  Palate midline & symmetric elevation.  Tongue midline, normal movements. Fundoscopy not done secondary to COVID-19 pandemic  Cortical: Visual fields reduced to blinks to threat B/L.    Motor: Normal muscle bulk, L. sided significant increased tone arm and leg.  L. arm flexed at rest, L. leg externally rotated at rest.  4-/5 throughout RUE testing, at least anti-gravity RLE.  LUE and LLE w/ some effort against gravity.   Sensation: Decreased sensation to light touch and temperature throughout LUE and LLE.  Preserved throughout R. but w/ limited testing 2* inattention.     Reflexes: 2+ BUE's, 0+ KJ b/l, 2+ AJ b/l, and neutral b/l plantar response   Coordination: Normal R. sided FTN, L. side w/ some minimal ataxia.  PT did not want to try LE.    Gait and station: Requested to stand up w/ PT but she was significantly anxious about doing so for fear of falling.     A/P:  Weakness  Dementia  Hypothyroidism  Prior stroke  HTN    - Reports of increased weakness but patient endorses and appears to be at or close to her neurologic baseline from past notes. Given prior history of stroke and some upper motor neuron signs on L side agree that it is prudent to rule out new cerebrovascular event or myelopathy but lower suspicion. Can also assess for toxic/metabolic factors but so far mainly unrevealing. Ultimately may need some PT and/or rehab as suspect this could be subacute progression of her neurodegenerative disorder  - MRI brain w/o and c-spine w/o; please call us back if results abnormal  - Check labs for additional causes with ESR, CRP  - PT/OT as tolerated  - Continue to address above medical issues, as you are doing  - Will continue to follow patient peripherally with you, please call with additional questions or concerns

## 2022-02-15 NOTE — H&P ADULT - PROBLEM SELECTOR PLAN 9
HSQ for dvt ppx    Medrec per discussion over phone a/ atria *hernando arce   family updated at time of admit

## 2022-02-15 NOTE — ED ADULT NURSE REASSESSMENT NOTE - NS ED NURSE REASSESS COMMENT FT1
turned, repositioned, changed wet diaper; oriented to name and place; skin warm and dry; await bed assignment.

## 2022-02-15 NOTE — H&P ADULT - HISTORY OF PRESENT ILLNESS
Given the patient encephalopathy/dementia, history collected via telephone from her daughter Mrs. Zhanna Covington [phone]. Med rec obtained via phone call with staff at the [facility] [phone]    86F w hx of CVA c/b LUE weakness, dementia?(charted A&OX2 baseline), CAD s/p stent, HTN, hx of DVT, hypothyroid p/w weakness and confusion. The patient reportedly has not been herself- confused, not engaging in conversation, and has generalized weakness w/ Right-sided gait preference over the last 3-4d. No reported fever, chills, cough, sob, cp, n/v/d or painful urination reported. The patient reportedly had a stroke July 2021 and can

## 2022-02-15 NOTE — H&P ADULT - PROBLEM SELECTOR PLAN 1
- evaluated by neurology. possible NPH on CT head. will get MR brain  - fall precaution and PT consult ordered  - NeuroSx evaluated given reported chronic Lumbar spine compression fracture however no acute surgical intervention required

## 2022-02-15 NOTE — H&P ADULT - NSHPPHYSICALEXAM_GEN_ALL_CORE
Vital Signs Last 24 Hrs  T(C): 36.4 (15 Feb 2022 06:35), Max: 37.6 (14 Feb 2022 19:15)  T(F): 97.6 (15 Feb 2022 06:35), Max: 99.7 (14 Feb 2022 19:15)  HR: 72 (15 Feb 2022 06:35) (60 - 82)  BP: 153/79 (15 Feb 2022 06:35) (129/84 - 172/89)  RR: 17 (15 Feb 2022 06:35) (17 - 20)  SpO2: 97% (15 Feb 2022 06:35) (95% - 98%) on RA    GENERAL: NAD, well-developed  HEAD:  Atraumatic, Normocephalic  EYES: EOMI, PERRL, conjunctiva and sclera clear  ENMT: MMM, good dentition  NECK: Supple, trachea midline  Lung: normal work of breathing, cta b/l  Cardiovascular: S1&S2+, rrr, no m/r/g appreciated; no pitting edema appreciated in b/l LE  ABDOMEN: bs+, soft, nt, nd, no appreciable masses  : No tsang catheter, no CVA tenderness  Neuro: A&Ox2(name and in hospital), no flaccid paralysis in extremities appreciated, RUE 5/5, LUE 4/5, b/l hip flexor 2/5, knee flexor 2-3/5 b/l, b/l dorsiflexion 5/5, sensation reported intact in b/l feet  SKIN: warm and dry, no visible purulence in exposed areas, normal skin turgor

## 2022-02-16 PROCEDURE — 99233 SBSQ HOSP IP/OBS HIGH 50: CPT

## 2022-02-16 PROCEDURE — 72141 MRI NECK SPINE W/O DYE: CPT | Mod: 26

## 2022-02-16 RX ADMIN — Medication 1 GRAM(S): at 06:09

## 2022-02-16 RX ADMIN — LOSARTAN POTASSIUM 100 MILLIGRAM(S): 100 TABLET, FILM COATED ORAL at 06:09

## 2022-02-16 RX ADMIN — CLOPIDOGREL BISULFATE 75 MILLIGRAM(S): 75 TABLET, FILM COATED ORAL at 11:15

## 2022-02-16 RX ADMIN — Medication 1 TABLET(S): at 11:15

## 2022-02-16 RX ADMIN — Medication 500 MILLIGRAM(S): at 11:15

## 2022-02-16 RX ADMIN — DULOXETINE HYDROCHLORIDE 60 MILLIGRAM(S): 30 CAPSULE, DELAYED RELEASE ORAL at 11:14

## 2022-02-16 RX ADMIN — HEPARIN SODIUM 5000 UNIT(S): 5000 INJECTION INTRAVENOUS; SUBCUTANEOUS at 17:23

## 2022-02-16 RX ADMIN — Medication 1 TABLET(S): at 11:14

## 2022-02-16 RX ADMIN — Medication 1000 UNIT(S): at 11:17

## 2022-02-16 RX ADMIN — Medication 1 GRAM(S): at 17:23

## 2022-02-16 RX ADMIN — PANTOPRAZOLE SODIUM 40 MILLIGRAM(S): 20 TABLET, DELAYED RELEASE ORAL at 06:03

## 2022-02-16 RX ADMIN — Medication 112 MICROGRAM(S): at 06:08

## 2022-02-16 RX ADMIN — ATORVASTATIN CALCIUM 80 MILLIGRAM(S): 80 TABLET, FILM COATED ORAL at 22:00

## 2022-02-16 RX ADMIN — Medication 12.5 MILLIGRAM(S): at 06:12

## 2022-02-16 RX ADMIN — HEPARIN SODIUM 5000 UNIT(S): 5000 INJECTION INTRAVENOUS; SUBCUTANEOUS at 06:12

## 2022-02-16 NOTE — PHYSICAL THERAPY INITIAL EVALUATION ADULT - BALANCE TRAINING, PT EVAL
GOAL: Pt will demonstrate improved static/dynamic standing balance to fair+, in order to improve stability, decrease fall risk and increase independence with ADLs within 4 weeks.

## 2022-02-16 NOTE — PATIENT PROFILE ADULT - FALL HARM RISK - HARM RISK INTERVENTIONS

## 2022-02-16 NOTE — PROGRESS NOTE ADULT - PROBLEM SELECTOR PLAN 9
DVT PPX: Heparin subcu    #Poor po intake: On puree diet. Nutritionist evaluation     Med recs done by admitting attending

## 2022-02-16 NOTE — PHYSICAL THERAPY INITIAL EVALUATION ADULT - ADDITIONAL COMMENTS
Pt's daughter Ilana present at end of session, reports pt lives at the [facility]. Primarily lives alone, has aide each morning. Pt was amb (I) with R/W and needed some assist with ADLs PTA. Daughter notes recently pt required increased assistance with amb and transfers. Pt owns: R/W, +grab bars.

## 2022-02-16 NOTE — PHYSICAL THERAPY INITIAL EVALUATION ADULT - PERTINENT HX OF CURRENT PROBLEM, REHAB EVAL
85 y/o F PMH: CVA (07/2021) c/b LUE weakness, dementia?, CAD s/p stent, HTN, hx of DVT, hypothyroid p/w generalized weakness and confusion. Dtr reports R-sided gait preference over the last 3-4days. XRAY CHEST (2/14): clear. B/L VA DUPLEX (2/14): (-). CT HEAD (2/14): Dilated ventricles out of proportion to the sulcal prominence ?d/t central atrophy vs normal pressure hydrocephalus. (-) intracranial hemorrhage, territorial infarct or mass effect. CONT BELOW

## 2022-02-16 NOTE — PATIENT PROFILE ADULT - SURGICAL SITE INCISION
Patient reports having body aches, cough, chest congestion, sore throat and sinus pressure for the past 3 days, symptoms progressing.     Is requesting Z-sandro/recommendations.    no

## 2022-02-16 NOTE — PHYSICAL THERAPY INITIAL EVALUATION ADULT - GAIT TRAINING, PT EVAL
----- Message from Elvi Ayala MD sent at 6/8/2021  4:12 PM EDT -----  Please call patient to follow up on 2 questions he had from his office visit today  I got a response from the EP team     - He does not need antibiotics prior to dental procedures  - He can go ahead with MRI, the device is safe      Thank you,  Dread [place] GOAL: Pt will ambulate 75 ft x 2 with MinAx2, w/use of appropriate assistive device in 4 weeks

## 2022-02-16 NOTE — PHYSICAL THERAPY INITIAL EVALUATION ADULT - PRECAUTIONS/LIMITATIONS, REHAB EVAL
CT ABD/PELVIS (2/15): Age-indeterminate L3-L5 compression fracture deformities./fall precautions CT ABD/PELVIS (2/15): Age-indeterminate L3-L5 compression fracture deformities. Pending C/S MRI/fall precautions CT ABD/PELVIS (2/15): Age-indeterminate L3-L5 compression fracture deformities. MRI C/S (2/16): c/s spondylosis with multilevel spinal stenosis and foraminal stenosis. C2-C3 small central disc protrusion. C5-C6 mod spinal stenosis with mild cord flattening. T1-T2 & T2-T3 mod spinal stenosis/fall precautions

## 2022-02-16 NOTE — PHYSICAL THERAPY INITIAL EVALUATION ADULT - TRANSFER TRAINING, PT EVAL
GOAL: Pt will perform ALL transfers with MinAx1 w/use of appropriate assistive device as needed, in 4 weeks.

## 2022-02-16 NOTE — PHYSICAL THERAPY INITIAL EVALUATION ADULT - GENERAL OBSERVATIONS, REHAB EVAL
Pt rec'd semi-supine in bed in NAD, +IVL, +primafit, agreeable to PT. As per NP Sangeeta menchaca for bedside PT evaluation. Pt rec'd semi-supine in bed in NAD, +IVL, +puriwick, agreeable to PT. As per NP Sangeeta menchaca for OOB activity with PT.

## 2022-02-16 NOTE — PHYSICAL THERAPY INITIAL EVALUATION ADULT - PLANNED THERAPY INTERVENTIONS, PT EVAL
GOAL: Pt will ascend/descend 1 step with standby assist with U HR and step to step pattern in 4 weeks./balance training/bed mobility training/gait training/strengthening/transfer training

## 2022-02-17 PROCEDURE — 99232 SBSQ HOSP IP/OBS MODERATE 35: CPT

## 2022-02-17 RX ADMIN — HEPARIN SODIUM 5000 UNIT(S): 5000 INJECTION INTRAVENOUS; SUBCUTANEOUS at 17:30

## 2022-02-17 RX ADMIN — ATORVASTATIN CALCIUM 80 MILLIGRAM(S): 80 TABLET, FILM COATED ORAL at 21:49

## 2022-02-17 RX ADMIN — Medication 112 MICROGRAM(S): at 06:41

## 2022-02-17 RX ADMIN — Medication 12.5 MILLIGRAM(S): at 06:41

## 2022-02-17 RX ADMIN — Medication 1 GRAM(S): at 06:42

## 2022-02-17 RX ADMIN — LOSARTAN POTASSIUM 100 MILLIGRAM(S): 100 TABLET, FILM COATED ORAL at 06:41

## 2022-02-17 RX ADMIN — Medication 1 TABLET(S): at 12:11

## 2022-02-17 RX ADMIN — HEPARIN SODIUM 5000 UNIT(S): 5000 INJECTION INTRAVENOUS; SUBCUTANEOUS at 06:41

## 2022-02-17 RX ADMIN — Medication 500 MILLIGRAM(S): at 12:11

## 2022-02-17 RX ADMIN — Medication 1 GRAM(S): at 17:30

## 2022-02-17 RX ADMIN — Medication 1000 UNIT(S): at 12:11

## 2022-02-17 RX ADMIN — PANTOPRAZOLE SODIUM 40 MILLIGRAM(S): 20 TABLET, DELAYED RELEASE ORAL at 06:41

## 2022-02-17 RX ADMIN — DULOXETINE HYDROCHLORIDE 60 MILLIGRAM(S): 30 CAPSULE, DELAYED RELEASE ORAL at 12:11

## 2022-02-17 RX ADMIN — CLOPIDOGREL BISULFATE 75 MILLIGRAM(S): 75 TABLET, FILM COATED ORAL at 12:11

## 2022-02-17 NOTE — CHART NOTE - NSCHARTNOTEFT_GEN_A_CORE
MRI brain and C-spine reviewed. No evidence of acute infarct. No signs concerning for myelopathy.     Recommendations  [] No further inpatient workup required from a Neurological standpoint.  [] Outpatient follow up with her Neurologist.  [] Neurology signing off. Please reconsult PRN or call [facility] with any questions.

## 2022-02-18 VITALS
DIASTOLIC BLOOD PRESSURE: 72 MMHG | SYSTOLIC BLOOD PRESSURE: 168 MMHG | TEMPERATURE: 98 F | HEART RATE: 68 BPM | RESPIRATION RATE: 18 BRPM | OXYGEN SATURATION: 97 %

## 2022-02-18 PROCEDURE — 99239 HOSP IP/OBS DSCHRG MGMT >30: CPT

## 2022-02-18 PROCEDURE — 70450 CT HEAD/BRAIN W/O DYE: CPT | Mod: QQ

## 2022-02-18 PROCEDURE — 97530 THERAPEUTIC ACTIVITIES: CPT

## 2022-02-18 PROCEDURE — 93005 ELECTROCARDIOGRAM TRACING: CPT

## 2022-02-18 PROCEDURE — 83880 ASSAY OF NATRIURETIC PEPTIDE: CPT

## 2022-02-18 PROCEDURE — U0005: CPT

## 2022-02-18 PROCEDURE — 85730 THROMBOPLASTIN TIME PARTIAL: CPT

## 2022-02-18 PROCEDURE — 85018 HEMOGLOBIN: CPT

## 2022-02-18 PROCEDURE — 82803 BLOOD GASES ANY COMBINATION: CPT

## 2022-02-18 PROCEDURE — 96375 TX/PRO/DX INJ NEW DRUG ADDON: CPT

## 2022-02-18 PROCEDURE — 82947 ASSAY GLUCOSE BLOOD QUANT: CPT

## 2022-02-18 PROCEDURE — 87086 URINE CULTURE/COLONY COUNT: CPT

## 2022-02-18 PROCEDURE — 72141 MRI NECK SPINE W/O DYE: CPT

## 2022-02-18 PROCEDURE — 82435 ASSAY OF BLOOD CHLORIDE: CPT

## 2022-02-18 PROCEDURE — 83690 ASSAY OF LIPASE: CPT

## 2022-02-18 PROCEDURE — 84443 ASSAY THYROID STIM HORMONE: CPT

## 2022-02-18 PROCEDURE — 97116 GAIT TRAINING THERAPY: CPT

## 2022-02-18 PROCEDURE — 84484 ASSAY OF TROPONIN QUANT: CPT

## 2022-02-18 PROCEDURE — 85610 PROTHROMBIN TIME: CPT

## 2022-02-18 PROCEDURE — 70551 MRI BRAIN STEM W/O DYE: CPT

## 2022-02-18 PROCEDURE — 74177 CT ABD & PELVIS W/CONTRAST: CPT | Mod: QQ

## 2022-02-18 PROCEDURE — 85014 HEMATOCRIT: CPT

## 2022-02-18 PROCEDURE — 83735 ASSAY OF MAGNESIUM: CPT

## 2022-02-18 PROCEDURE — 85025 COMPLETE CBC W/AUTO DIFF WBC: CPT

## 2022-02-18 PROCEDURE — 99285 EMERGENCY DEPT VISIT HI MDM: CPT | Mod: 25

## 2022-02-18 PROCEDURE — 93970 EXTREMITY STUDY: CPT

## 2022-02-18 PROCEDURE — 71045 X-RAY EXAM CHEST 1 VIEW: CPT

## 2022-02-18 PROCEDURE — 84132 ASSAY OF SERUM POTASSIUM: CPT

## 2022-02-18 PROCEDURE — 96374 THER/PROPH/DIAG INJ IV PUSH: CPT

## 2022-02-18 PROCEDURE — 82140 ASSAY OF AMMONIA: CPT

## 2022-02-18 PROCEDURE — 83605 ASSAY OF LACTIC ACID: CPT

## 2022-02-18 PROCEDURE — 84100 ASSAY OF PHOSPHORUS: CPT

## 2022-02-18 PROCEDURE — 80048 BASIC METABOLIC PNL TOTAL CA: CPT

## 2022-02-18 PROCEDURE — 82550 ASSAY OF CK (CPK): CPT

## 2022-02-18 PROCEDURE — 81001 URINALYSIS AUTO W/SCOPE: CPT

## 2022-02-18 PROCEDURE — U0003: CPT

## 2022-02-18 PROCEDURE — 84295 ASSAY OF SERUM SODIUM: CPT

## 2022-02-18 PROCEDURE — 80053 COMPREHEN METABOLIC PANEL: CPT

## 2022-02-18 PROCEDURE — 97161 PT EVAL LOW COMPLEX 20 MIN: CPT

## 2022-02-18 PROCEDURE — 0225U NFCT DS DNA&RNA 21 SARSCOV2: CPT

## 2022-02-18 PROCEDURE — 36415 COLL VENOUS BLD VENIPUNCTURE: CPT

## 2022-02-18 PROCEDURE — 82330 ASSAY OF CALCIUM: CPT

## 2022-02-18 RX ORDER — SENNOSIDES/DOCUSATE SODIUM 8.6MG-50MG
2 TABLET ORAL
Qty: 0 | Refills: 0 | DISCHARGE

## 2022-02-18 RX ORDER — ASCORBIC ACID 60 MG
1 TABLET,CHEWABLE ORAL
Qty: 0 | Refills: 0 | DISCHARGE
Start: 2022-02-18

## 2022-02-18 RX ORDER — PANTOPRAZOLE SODIUM 20 MG/1
1 TABLET, DELAYED RELEASE ORAL
Qty: 0 | Refills: 0 | DISCHARGE
Start: 2022-02-18

## 2022-02-18 RX ORDER — IRON,CARBONYL/ASCORBIC ACID 65MG-125MG
1 TABLET, DELAYED RELEASE (ENTERIC COATED) ORAL
Qty: 0 | Refills: 0 | DISCHARGE

## 2022-02-18 RX ORDER — ASCORBIC ACID 60 MG
1 TABLET,CHEWABLE ORAL
Qty: 0 | Refills: 0 | DISCHARGE

## 2022-02-18 RX ORDER — OLMESARTAN MEDOXOMIL-HYDROCHLOROTHIAZIDE 25; 40 MG/1; MG/1
1 TABLET, FILM COATED ORAL
Qty: 0 | Refills: 0 | DISCHARGE

## 2022-02-18 RX ORDER — SENNA PLUS 8.6 MG/1
2 TABLET ORAL
Qty: 0 | Refills: 0 | DISCHARGE
Start: 2022-02-18

## 2022-02-18 RX ORDER — OMEPRAZOLE 10 MG/1
1 CAPSULE, DELAYED RELEASE ORAL
Qty: 0 | Refills: 0 | DISCHARGE

## 2022-02-18 RX ADMIN — Medication 500 MILLIGRAM(S): at 12:47

## 2022-02-18 RX ADMIN — Medication 1 GRAM(S): at 17:40

## 2022-02-18 RX ADMIN — Medication 12.5 MILLIGRAM(S): at 05:04

## 2022-02-18 RX ADMIN — CLOPIDOGREL BISULFATE 75 MILLIGRAM(S): 75 TABLET, FILM COATED ORAL at 12:48

## 2022-02-18 RX ADMIN — DULOXETINE HYDROCHLORIDE 60 MILLIGRAM(S): 30 CAPSULE, DELAYED RELEASE ORAL at 12:48

## 2022-02-18 RX ADMIN — Medication 1 TABLET(S): at 12:47

## 2022-02-18 RX ADMIN — HEPARIN SODIUM 5000 UNIT(S): 5000 INJECTION INTRAVENOUS; SUBCUTANEOUS at 17:40

## 2022-02-18 RX ADMIN — HEPARIN SODIUM 5000 UNIT(S): 5000 INJECTION INTRAVENOUS; SUBCUTANEOUS at 05:05

## 2022-02-18 RX ADMIN — LOSARTAN POTASSIUM 100 MILLIGRAM(S): 100 TABLET, FILM COATED ORAL at 05:05

## 2022-02-18 RX ADMIN — Medication 1000 UNIT(S): at 12:47

## 2022-02-18 RX ADMIN — PANTOPRAZOLE SODIUM 40 MILLIGRAM(S): 20 TABLET, DELAYED RELEASE ORAL at 05:06

## 2022-02-18 RX ADMIN — Medication 112 MICROGRAM(S): at 05:05

## 2022-02-18 RX ADMIN — Medication 1 GRAM(S): at 05:05

## 2022-02-18 NOTE — PROGRESS NOTE ADULT - NSPROGADDITIONALINFOA_GEN_ALL_CORE
Case and plan discussed with ACP: Daniela
d/w two daughters at bedside. All questions answered
Case and plan discussed with ACP: Sangeeta

## 2022-02-18 NOTE — DISCHARGE NOTE PROVIDER - NSDCMRMEDTOKEN_GEN_ALL_CORE_FT
acetaminophen 325 mg oral tablet: 2 tab(s) orally every 6 hours, As Needed  Acidophilus oral capsule: 1 cap(s) orally once a day  atorvastatin 80 mg oral tablet: 1 tab(s) orally once a day  Citracal 250 mg + D 200 intl units oral tablet: 1 tab(s) orally once a day  clopidogrel 75 mg oral tablet: 1 tab(s) orally once a day  DULoxetine 60 mg oral delayed release capsule: 1 cap(s) orally once a day  levothyroxine 112 mcg (0.112 mg) oral tablet: 1 tab(s) orally once a day  methenamine hippurate 1 g oral tablet: 1 tab(s) orally 2 times a day  Multiple Vitamins oral tablet: 1 tab(s) orally once a day  olmesartan-hydrochlorothiazide 40 mg-12.5 mg oral tablet: 1 tab(s) orally once a day  omeprazole 20 mg oral delayed release tablet: 1 tab(s) orally once a day  Senexon-S 50 mg-8.6 mg oral tablet: 2 tab(s) orally once a day (at bedtime)  Vitamin C 500 mg oral tablet: 1 tab(s) orally once a day  Vitamin D3 25 mcg (1000 intl units) oral tablet: 1 tab(s) orally once a day  Vitron-C 125 mg-65 mg oral tablet: 1 tab(s) orally once a day   acetaminophen 325 mg oral tablet: 2 tab(s) orally every 6 hours, As Needed  Acidophilus oral capsule: 1 cap(s) orally once a day  ascorbic acid 500 mg oral tablet: 1 tab(s) orally once a day  atorvastatin 80 mg oral tablet: 1 tab(s) orally once a day  calcium-vitamin D 500 mg-5 mcg (200 intl units) oral tablet: 1 tab(s) orally once a day  clopidogrel 75 mg oral tablet: 1 tab(s) orally once a day  DULoxetine 60 mg oral delayed release capsule: 1 cap(s) orally once a day  levothyroxine 112 mcg (0.112 mg) oral tablet: 1 tab(s) orally once a day  methenamine hippurate 1 g oral tablet: 1 tab(s) orally 2 times a day  Multiple Vitamins oral tablet: 1 tab(s) orally once a day  pantoprazole 40 mg oral delayed release tablet: 1 tab(s) orally once a day (before a meal)  senna oral tablet: 2 tab(s) orally once a day (at bedtime)  Vitamin D3 25 mcg (1000 intl units) oral tablet: 1 tab(s) orally once a day

## 2022-02-18 NOTE — DISCHARGE NOTE PROVIDER - CARE PROVIDER_API CALL
Rob Grayson  CLINICAL NEUROPHYSIOLOGY  [address] [place] [address]  Phone: [phone]  Fax: [phone]  Follow Up Time:     Joao Bailey  CARDIOVASCULAR DISEASE  [address] [facility], [address]  [place], [place] [address]  Phone: [phone]  Fax: [phone]  Follow Up Time:     GLORIA ALY  Internal Medicine  [address] [place] [address]  Phone: [phone]  Fax: [phone]  Follow Up Time:

## 2022-02-18 NOTE — PROGRESS NOTE ADULT - PROBLEM SELECTOR PLAN 7
continue levothyroxine   TSH wnl

## 2022-02-18 NOTE — DISCHARGE NOTE NURSING/CASE MANAGEMENT/SOCIAL WORK - NSDCPEFALRISK_GEN_ALL_CORE
For information on Fall & Injury Prevention, visit: [email or web address] OR  [email or web address] OR  https://www.cdc.gov/steadi/patient.html

## 2022-02-18 NOTE — PROGRESS NOTE ADULT - PROBLEM SELECTOR PLAN 6
continue losartan and HCTZ while inpatient
continue losartan and HCTZ while inpatient
continue losartan and HCTZ
continue losartan and HCTZ while inpatient

## 2022-02-18 NOTE — PROGRESS NOTE ADULT - PROBLEM SELECTOR PLAN 9
DVT PPX: Heparin subcu      PT [facility]  Choices to be given today by daughters  Dispo planning to [facility] DVT PPX: Heparin subcu      PT [facility]  Choices to be given today by daughters  Dispo planning to [facility]  d/c time 34 minutes

## 2022-02-18 NOTE — DISCHARGE NOTE PROVIDER - NSDCFUADDINST_GEN_ALL_CORE_FT
Followup with md's as outlined in f/u - with Dr. Grayson / Dr. Bailey   Take your medications as prescribed

## 2022-02-18 NOTE — PROGRESS NOTE ADULT - ASSESSMENT
86F w hx of CVA c/b LUE weakness, dementia?(charted A&OX2 baseline), CAD s/p stent, HTN, hx of DVT, hypothyroid p/w weakness and confusion from baseline admit to medicine for further evaluation.  

## 2022-02-18 NOTE — DISCHARGE NOTE PROVIDER - NSDCCPCAREPLAN_GEN_ALL_CORE_FT
PRINCIPAL DISCHARGE DIAGNOSIS  Diagnosis: Generalized weakness  Assessment and Plan of Treatment: Generalized weakness; evaluated by neurology team & neurosx given reported ch. Lumbar spine compression fracture however no acute surgical intervention recommended  - MRI brain :  possibility of NPH versus atrophy. Extensive nonacute small vessel white matter ischemic changes and brainstem ischemia. More likely Atrophy   - MRI C spine, Cervical spondylosis with multilevel spinal stenosis and foraminal stenosis as described above.  C2-C3 small central disc protrusion. C5-C6 moderate spinal stenosis with mild cord flattening. T1-T2 and T2-T3 moderate spinal stenosis ESR, CRP  Strict fall precautions, PT eval -->ELMIRA and Neuro checks.      SECONDARY DISCHARGE DIAGNOSES  Diagnosis: Cerebral ventriculomegaly  Assessment and Plan of Treatment: Cerebral ventriculomegaly; as above     PRINCIPAL DISCHARGE DIAGNOSIS  Diagnosis: Generalized weakness  Assessment and Plan of Treatment: Generalized weakness; evaluated by neurology team & neurosx given reported ch. Lumbar spine compression fracture however no acute surgical intervention recommended  - MRI brain :  possibility of NPH versus atrophy. Extensive nonacute small vessel white matter ischemic changes and brainstem ischemia. More likely Atrophy   - MRI C spine, Cervical spondylosis with multilevel spinal stenosis and foraminal stenosis as described above.  C2-C3 small central disc protrusion. C5-C6 moderate spinal stenosis with mild cord flattening. T1-T2 and T2-T3 moderate spinal stenosis ESR, CRP  Strict fall precautions, PT eval -->ELMIRA and Neuro checks.      SECONDARY DISCHARGE DIAGNOSES  Diagnosis: Cerebral ventriculomegaly  Assessment and Plan of Treatment: Cerebral ventriculomegaly; as above    Diagnosis: Cerebrovascular accident (CVA)  Assessment and Plan of Treatment: Cerebrovascular accident (CVA); History of CVA. No CVA on MRI just atrophy and chronic ischemic changes & continue clopidogrel  Has LUE weakness that is associated w/ previous CVA as well vascular dementia - seen and folwed by neurology and signed off - will follow with neurologist Dr. Grayson [facility] [phone]      Diagnosis: Lumbar compression fracture  Assessment and Plan of Treatment: Lumbar compression fracture as above.    Diagnosis: CAD (coronary artery disease)  Assessment and Plan of Treatment: CAD (coronary artery disease); continue clopidogrel.    Diagnosis: HTN (hypertension)  Assessment and Plan of Treatment: HTN (hypertension); continue losartan and HCTZ.    Diagnosis: Leukocytosis  Assessment and Plan of Treatment: Leukocytosis; family reports chronic leukocytosis. Last WBC was WNL  on hiprex outpatient, urine culture no growth & afebrile, nontoxic appearing.

## 2022-02-18 NOTE — DISCHARGE NOTE PROVIDER - PROVIDER TOKENS
PROVIDER:[TOKEN:[[id number]]],PROVIDER:[TOKEN:[[id number]]],PROVIDER:[TOKEN:[[id number]]]

## 2022-02-18 NOTE — PROGRESS NOTE ADULT - PROBLEM SELECTOR PROBLEM 2
Cerebral ventriculomegaly

## 2022-02-18 NOTE — DISCHARGE NOTE PROVIDER - HOSPITAL COURSE
86F w hx of CVA c/b LUE weakness, dementia?(charted A&OX2 baseline), CAD s/p stent, HTN, hx of DVT, hypothyroid p/w weakness and confusion from baseline admit to medicine for further evaluation.    Generalized weakness; evaluated by neurology team & neurosx given reported ch. Lumbar spine compression fracture however no acute surgical intervention recommended  - MRI brain :  possibility of NPH versus atrophy. Extensive nonacute small vessel white matter ischemic changes and brainstem ischemia. More likely Atrophy   - MRI C spine, Cervical spondylosis with multilevel spinal stenosis and foraminal stenosis as described above.  C2-C3 small central disc protrusion. C5-C6 moderate spinal stenosis with mild cord flattening. T1-T2 and T2-T3 moderate spinal stenosis ESR, CRP  Strict fall precautions, PT eval -->ELMIRA and Neuro checks.    Cerebral ventriculomegaly; as above     Cerebrovascular accident (CVA); History of CVA. No CVA on MRI just atrophy and chronic ischemic changes & continue clopidogrel  Has LUE weakness that is associated w/ previous CVA as well vascular dementia - seen and folwed by neurology and signed off - will follow with neurologist Dr. Grayson [facility] [place] [phone]    Lumbar compression fracture as above.    CAD (coronary artery disease); continue clopidogrel.    HTN (hypertension); continue losartan and HCTZ.    Hypothyroid; continue levothyroxine - TSH wnl.    Leukocytosis; family reports chronic leukocytosis. Last WBC was WNL  on hiprex outpatient, urine culture no growth & afebrile, nontoxic appearing.    DVT PPX: Heparin subcu  PT ELMIRA - per daughters - ELMIRA and on 2/18 pt. discharged ELMIRA - ( d/w Dr. Cleary)

## 2022-02-18 NOTE — PROGRESS NOTE ADULT - PROBLEM SELECTOR PLAN 8
family reports chronic leukocytosis. Last WBC was WNL  on hiprex outpatient  urine culture no growth   afebrile, nontoxic appearing
family reports chronic leukocytosis  on hiprex outpatient  f/u urine culture no growth   afebrile, nontoxic appearing
family reports chronic leukocytosis  on hiprex outpatient  f/u urine culture  afebrile, nontoxic appearing
family reports chronic leukocytosis  on hiprex outpatient  f/u urine culture no growth   afebrile, nontoxic appearing

## 2022-02-18 NOTE — PROGRESS NOTE ADULT - SUBJECTIVE AND OBJECTIVE BOX
Christofer Cleary MD  Division of Hospital Medicine  Available via MS teams  If no response or off hours page: [phone]  ---------------------------------------------------------    KOKO CORRIGAN  86y  Female      Patient is a 86y old  Female who presents with a chief complaint of 86F p/w confusion and weakness (16 Feb 2022 14:52)      INTERVAL HPI/OVERNIGHT EVENTS:  No overnight events. Pain controlled.      REVIEW OF SYSTEMS: 10 point ROS negative unless listed above    T(C): 36.4 (02-17-22 @ 11:54), Max: 36.9 (02-16-22 @ 20:08)  HR: 68 (02-17-22 @ 11:54) (68 - 79)  BP: 160/83 (02-17-22 @ 11:54) (151/83 - 167/87)  RR: 18 (02-17-22 @ 11:54) (18 - 18)  SpO2: 96% (02-17-22 @ 11:54) (94% - 96%)  Wt(kg): --Vital Signs Last 24 Hrs  T(C): 36.4 (17 Feb 2022 11:54), Max: 36.9 (16 Feb 2022 20:08)  T(F): 97.5 (17 Feb 2022 11:54), Max: 98.5 (16 Feb 2022 20:08)  HR: 68 (17 Feb 2022 11:54) (68 - 79)  BP: 160/83 (17 Feb 2022 11:54) (151/83 - 167/87)  BP(mean): --  RR: 18 (17 Feb 2022 11:54) (18 - 18)  SpO2: 96% (17 Feb 2022 11:54) (94% - 96%)    PHYSICAL EXAM:  GENERAL: NAD, well-developed, elderly   HEAD:  Atraumatic, Normocephalic  NECK: Supple, trachea midline  Lung: normal work of breathing, CTABL  Cardiovascular: S1&S2+, rrr, no m/r/g appreciated; no pitting edema appreciated in b/l LE  ABDOMEN: bs+, soft, nt, nd, no appreciable masses  : No tsang catheter, no CVA tenderness  Neuro: A&Ox2 (name, place, year), no flaccid paralysis in extremities appreciated, RUE 5/5, LUE 4/5, b/l hip flexor 2/5, knee flexor 2-3/5 b/l, b/l dorsiflexion 5/5, sensation reported intact in b/l feet      Consultant(s) Notes Reviewed:  [x ] YES  [ ] NO  Care Discussed with Consultants/Other Providers [ x] YES  [ ] NO    LABS:              CAPILLARY BLOOD GLUCOSE                RADIOLOGY & ADDITIONAL TESTS:    Imaging Personally Reviewed:  [ ] YES  [ ] NO
[facility] Division of Hospital Medicine  Yoav Arenas MD  Pager (M-F, 6P-5P): [phone]  Other Times:  [phone]    Patient is a 86y old  Female who presents with a chief complaint of 86F p/w confusion and weakness (15 Feb 2022 07:07)      SUBJECTIVE / OVERNIGHT EVENTS:    Patient was examined this morning. AxOx2 (name, place, year, not day/month). Denies any acute issues. Seems bit confused about her surroundings and people around her.     ADDITIONAL REVIEW OF SYSTEMS:    MEDICATIONS  (STANDING):  ascorbic acid 500 milliGRAM(s) Oral daily  atorvastatin 80 milliGRAM(s) Oral at bedtime  calcium carbonate 1250 mG  + Vitamin D (OsCal 500 + D) 1 Tablet(s) Oral daily  cholecalciferol 1000 Unit(s) Oral daily  clopidogrel Tablet 75 milliGRAM(s) Oral daily  DULoxetine 60 milliGRAM(s) Oral daily  heparin   Injectable 5000 Unit(s) SubCutaneous every 12 hours  hydrochlorothiazide 12.5 milliGRAM(s) Oral daily  lactobacillus acidophilus 1 Tablet(s) Oral daily  levothyroxine 112 MICROGram(s) Oral daily  losartan 100 milliGRAM(s) Oral daily  methenamine hippurate 1 Gram(s) Oral two times a day  multivitamin 1 Tablet(s) Oral daily  pantoprazole    Tablet 40 milliGRAM(s) Oral before breakfast  senna 2 Tablet(s) Oral at bedtime    MEDICATIONS  (PRN):  acetaminophen     Tablet .. 650 milliGRAM(s) Oral every 6 hours PRN Mild Pain (1 - 3), Moderate Pain (4 - 6)      CAPILLARY BLOOD GLUCOSE        I&O's Summary      PHYSICAL EXAM:  Vital Signs Last 24 Hrs  T(C): 36.4 (15 Feb 2022 13:25), Max: 37.6 (2022 19:15)  T(F): 97.5 (15 Feb 2022 13:25), Max: 99.7 (2022 19:15)  HR: 65 (15 Feb 2022 13:25) (60 - 82)  BP: 133/84 (15 Feb 2022 13:25) (129/84 - 172/89)  BP(mean): --  RR: 18 (15 Feb 2022 13:25) (17 - 20)  SpO2: 98% (15 Feb 2022 13:25) (95% - 98%)      GENERAL: NAD, well-developed, elderly   HEAD:  Atraumatic, Normocephalic  EYES: EOMI, PERRL, conjunctiva and sclera clear  ENMT: MMM, good dentition  NECK: Supple, trachea midline  Lung: normal work of breathing, CTABL  Cardiovascular: S1&S2+, rrr, no m/r/g appreciated; no pitting edema appreciated in b/l LE  ABDOMEN: bs+, soft, nt, nd, no appreciable masses  : No tsang catheter, no CVA tenderness  Neuro: A&Ox2 (name, place, year), no flaccid paralysis in extremities appreciated, RUE 5/5, LUE 4/5, b/l hip flexor 2/5, knee flexor 2-3/5 b/l, b/l dorsiflexion 5/5, sensation reported intact in b/l feet  PSYCH: bit confused, slightly anxious/ paranoid   SKIN: warm and dry, no visible purulence in exposed areas, normal skin turgor        LABS:                        10.9   9.78  )-----------( 270      ( 15 Feb 2022 08:23 )             33.5     02-15    136  |  97  |  25<H>  ----------------------------<  104<H>  3.5   |  25  |  1.20    Ca    9.7      15 Feb 2022 08:23  Phos  3.5     02-14  Mg     1.7     02-14    TPro  7.1  /  Alb  3.6  /  TBili  0.4  /  DBili  x   /  AST  13  /  ALT  17  /  AlkPhos  115  02-14    PT/INR - ( 2022 20:24 )   PT: 13.9 sec;   INR: 1.17 ratio         PTT - ( 2022 20:24 )  PTT:30.1 sec  CARDIAC MARKERS ( 2022 20:24 )  x     / x     / 85 U/L / x     / x          Urinalysis Basic - ( 2022 20:31 )    Color: Yellow / Appearance: Clear / S.022 / pH: x  Gluc: x / Ketone: Negative  / Bili: Negative / Urobili: Negative   Blood: x / Protein: 300 mg/dL / Nitrite: Negative   Leuk Esterase: Negative / RBC: 1 /hpf / WBC 4 /HPF   Sq Epi: x / Non Sq Epi: 4 /hpf / Bacteria: Negative          RADIOLOGY & ADDITIONAL TESTS:  Results Reviewed:   Imaging Personally Reviewed:  Electrocardiogram Personally Reviewed:    COORDINATION OF CARE:  Care Discussed with Consultants/Other Providers [Y/N]:  Prior or Outpatient Records Reviewed [Y/N]:  
[facility] Division of Hospital Medicine  Yoav Arenas MD  Pager (TIM-CHADD, 2B-5P): [phone]  Other Times:  [phone]    Patient is a 86y old  Female who presents with a chief complaint of 86F p/w confusion and weakness (15 Feb 2022 15:34)      SUBJECTIVE / OVERNIGHT EVENTS:    Patient was examined this morning. She is AxOx2 (name, place), bit confused. She doesn't want to eat her breakfast. Denies any acute complaint.       ADDITIONAL REVIEW OF SYSTEMS:    MEDICATIONS  (STANDING):  ascorbic acid 500 milliGRAM(s) Oral daily  atorvastatin 80 milliGRAM(s) Oral at bedtime  calcium carbonate 1250 mG  + Vitamin D (OsCal 500 + D) 1 Tablet(s) Oral daily  cholecalciferol 1000 Unit(s) Oral daily  clopidogrel Tablet 75 milliGRAM(s) Oral daily  DULoxetine 60 milliGRAM(s) Oral daily  heparin   Injectable 5000 Unit(s) SubCutaneous every 12 hours  hydrochlorothiazide 12.5 milliGRAM(s) Oral daily  lactobacillus acidophilus 1 Tablet(s) Oral daily  levothyroxine 112 MICROGram(s) Oral daily  losartan 100 milliGRAM(s) Oral daily  methenamine hippurate 1 Gram(s) Oral two times a day  multivitamin 1 Tablet(s) Oral daily  pantoprazole    Tablet 40 milliGRAM(s) Oral before breakfast  senna 2 Tablet(s) Oral at bedtime    MEDICATIONS  (PRN):  acetaminophen     Tablet .. 650 milliGRAM(s) Oral every 6 hours PRN Mild Pain (1 - 3), Moderate Pain (4 - 6)      CAPILLARY BLOOD GLUCOSE        I&O's Summary    15 Feb 2022 07:01  -  2022 07:00  --------------------------------------------------------  IN: 250 mL / OUT: 1000 mL / NET: -750 mL        PHYSICAL EXAM:  Vital Signs Last 24 Hrs  T(C): 36.8 (2022 04:49), Max: 36.8 (15 Feb 2022 20:06)  T(F): 98.2 (2022 04:49), Max: 98.2 (15 Feb 2022 20:06)  HR: 76 (2022 14:02) (74 - 93)  BP: 168/77 (2022 14:02) (161/83 - 171/95)  BP(mean): --  RR: 18 (2022 04:49) (18 - 18)  SpO2: 94% (2022 14:02) (93% - 97%)      GENERAL: NAD, well-developed, elderly   HEAD:  Atraumatic, Normocephalic  EYES: EOMI, PERRL, conjunctiva and sclera clear  ENMT: MMM, good dentition  NECK: Supple, trachea midline  Lung: normal work of breathing, CTABL  Cardiovascular: S1&S2+, rrr, no m/r/g appreciated; no pitting edema appreciated in b/l LE  ABDOMEN: bs+, soft, nt, nd, no appreciable masses  : No tsang catheter, no CVA tenderness  Neuro: A&Ox2 (name, place, year), no flaccid paralysis in extremities appreciated, RUE 5/5, LUE 4/5, b/l hip flexor 2/5, knee flexor 2-3/5 b/l, b/l dorsiflexion 5/5, sensation reported intact in b/l feet  PSYCH: bit confused, slightly anxious/ paranoid   SKIN: warm and dry, no visible purulence in exposed areas, normal skin turgor          LABS:                        10.9   9.78  )-----------( 270      ( 15 Feb 2022 08:23 )             33.5     02-15    136  |  97  |  25<H>  ----------------------------<  104<H>  3.5   |  25  |  1.20    Ca    9.7      15 Feb 2022 08:23  Phos  3.5     02-14  Mg     1.7     -14    TPro  7.1  /  Alb  3.6  /  TBili  0.4  /  DBili  x   /  AST  13  /  ALT  17  /  AlkPhos  115  02-14    PT/INR - ( 2022 20:24 )   PT: 13.9 sec;   INR: 1.17 ratio         PTT - ( 2022 20:24 )  PTT:30.1 sec  CARDIAC MARKERS ( 2022 20:24 )  x     / x     / 85 U/L / x     / x          Urinalysis Basic - ( 2022 20:31 )    Color: Yellow / Appearance: Clear / S.022 / pH: x  Gluc: x / Ketone: Negative  / Bili: Negative / Urobili: Negative   Blood: x / Protein: 300 mg/dL / Nitrite: Negative   Leuk Esterase: Negative / RBC: 1 /hpf / WBC 4 /HPF   Sq Epi: x / Non Sq Epi: 4 /hpf / Bacteria: Negative        Culture - Urine (collected 15 Feb 2022 00:38)  Source: Clean Catch Clean Catch (Midstream)  Final Report (15 Feb 2022 20:38):    No growth        RADIOLOGY & ADDITIONAL TESTS:  Results Reviewed:   Imaging Personally Reviewed:  Electrocardiogram Personally Reviewed:    COORDINATION OF CARE:  Care Discussed with Consultants/Other Providers [Y/N]:  Prior or Outpatient Records Reviewed [Y/N]:  
Christofer Cleary MD  Division of Hospital Medicine  Available via MS teams  If no response or off hours page: [phone]  ---------------------------------------------------------    KOKO CORRIGAN  86y  Female      Patient is a 86y old  Female who presents with a chief complaint of 86F p/w confusion and weakness (17 Feb 2022 14:07)      INTERVAL HPI/OVERNIGHT EVENTS:  No overnight events. Back at baseline as per 2 daughters at bedside.       REVIEW OF SYSTEMS: 10 point ROS negative unless listed above    T(C): 36.6 (02-18-22 @ 13:03), Max: 36.7 (02-17-22 @ 20:17)  HR: 68 (02-18-22 @ 13:03) (68 - 69)  BP: 168/72 (02-18-22 @ 13:03) (150/89 - 168/72)  RR: 18 (02-18-22 @ 13:03) (18 - 18)  SpO2: 97% (02-18-22 @ 13:03) (93% - 97%)  Wt(kg): --Vital Signs Last 24 Hrs  T(C): 36.6 (18 Feb 2022 13:03), Max: 36.7 (17 Feb 2022 20:17)  T(F): 97.8 (18 Feb 2022 13:03), Max: 98 (17 Feb 2022 20:17)  HR: 68 (18 Feb 2022 13:03) (68 - 69)  BP: 168/72 (18 Feb 2022 13:03) (150/89 - 168/72)  BP(mean): --  RR: 18 (18 Feb 2022 13:03) (18 - 18)  SpO2: 97% (18 Feb 2022 13:03) (93% - 97%)    PHYSICAL EXAM:  GENERAL: NAD, well-developed, elderly   HEAD:  Atraumatic, Normocephalic  NECK: Supple, trachea midline  Lung: normal work of breathing, CTABL  Cardiovascular: S1&S2+, rrr, no m/r/g appreciated; no pitting edema appreciated in b/l LE  ABDOMEN: bs+, soft, nt, nd, no appreciable masses    Consultant(s) Notes Reviewed:  [x ] YES  [ ] NO  Care Discussed with Consultants/Other Providers [ x] YES  [ ] NO    LABS:              CAPILLARY BLOOD GLUCOSE                RADIOLOGY & ADDITIONAL TESTS:    Imaging Personally Reviewed:  [ ] YES  [ ] NO

## 2022-02-18 NOTE — PROGRESS NOTE ADULT - REASON FOR ADMISSION
86F p/w confusion and weakness

## 2022-02-18 NOTE — DISCHARGE NOTE NURSING/CASE MANAGEMENT/SOCIAL WORK - PATIENT PORTAL LINK FT
You can access the FollowMyHealth Patient Portal offered by [facility] by registering at the following website: [email or web address]. By joining [employer]’s FollowMyHealth portal, you will also be able to view your health information using other applications (apps) compatible with our system.

## 2022-02-18 NOTE — PROGRESS NOTE ADULT - PROBLEM SELECTOR PLAN 1
- evaluated by neurology team. possible NPH on CT head.   - evaluated by NeuroSx given reported chronic Lumbar spine compression fracture however no acute surgical intervention recommended  - MRI brain :  possibility of NPH versus atrophy. Extensive nonacute small vessel white matter ischemic changes and brainstem ischemia  - F/U MRI C spine, ESR, CRP  - strict fall precautions  - PT eval   - Neuro checks
- evaluated by neurology team. possible NPH on CT head.   - evaluated by NeuroSx given reported chronic Lumbar spine compression fracture however no acute surgical intervention recommended  - MRI brain :  possibility of NPH versus atrophy. Extensive nonacute small vessel white matter ischemic changes and brainstem ischemia  - MRI C spine, Cervical spondylosis with multilevel spinal stenosis and foraminal stenosis as described above.  C2-C3 small central disc protrusion. C5-C6 moderate spinal stenosis with mild cord flattening. T1-T2 and T2-T3 moderate spinal stenosis ESR, CRP  - strict fall precautions  - PT eval   - Neuro checks
- evaluated by neurology team.   - evaluated by NeuroSx given reported chronic Lumbar spine compression fracture however no acute surgical intervention recommended  - MRI brain :  possibility of NPH versus atrophy. Extensive nonacute small vessel white matter ischemic changes and brainstem ischemia. More likely Atrophy   - MRI C spine, Cervical spondylosis with multilevel spinal stenosis and foraminal stenosis as described above.  C2-C3 small central disc protrusion. C5-C6 moderate spinal stenosis with mild cord flattening. T1-T2 and T2-T3 moderate spinal stenosis ESR, CRP  - strict fall precautions  - PT eval -->ELMIRA  - Neuro checks
- evaluated by neurology team. possible NPH on CT head.   - evaluated by NeuroSx given reported chronic Lumbar spine compression fracture however no acute surgical intervention recommended  - F/U MRI brain, ESR, CRP  - strict fall precautions  - PT eval  - Neuro checks

## 2022-02-18 NOTE — PROGRESS NOTE ADULT - PROBLEM SELECTOR PLAN 3
History of CVA  continue clopidogrel  Has LUE weakness that is associated w/ previous CVA as well vascular dementia  Follows with neurologist Dr. Grayson of [facility] [place] [phone]  Neuro has signed off
History of CVA  continue clopidogrel  Has LUE weakness that is associated w/ previous CVA as well vascular dementia  Follows with neurologist Dr. Grayson of [facility] [place] [phone]
History of CVA  continue clopidogrel  Has LUE weakness that is associated w/ previous CVA as well vascular dementia  Follows with neurologist Dr. Gr[facility] [place] [phone]
History of CVA. No CVA on MRI just atrophy and chronic ischemic changes  continue clopidogrel  Has LUE weakness that is associated w/ previous CVA as well vascular dementia  Follows with neurologist Dr. Grayson [facility] [place] [phone]  Neuro has signed off

## 2022-02-18 NOTE — PROGRESS NOTE ADULT - PROBLEM SELECTOR PROBLEM 4
Lumbar compression fracture

## 2022-11-21 NOTE — PROGRESS NOTE ADULT - PROBLEM/PLAN-7
DISPLAY PLAN FREE TEXT
Excision Depth: adipose tissue

## 2023-10-14 ASSESSMENT — TONOMETRY
OS_IOP_MMHG: 20
OD_IOP_MMHG: 21
OD_IOP_MMHG: 15
OD_IOP_MMHG: 20
OS_IOP_MMHG: 20
OS_IOP_MMHG: 14

## 2023-10-14 ASSESSMENT — VISUAL ACUITY
OD_CC: J1
OD_SC: 20/30-
OS_SC: 20/25-
OD_SC: 20/30
OD_CC: 20/30+2
OD_CC: 20/25
OS_CC: 20/25
OS_CC: 20/50
OS_CC: 20/40-1
OS_SC: 20/40
OS_CC: J2
OU_CC: 20/25
OD_CC: 20/25-
